# Patient Record
Sex: FEMALE
[De-identification: names, ages, dates, MRNs, and addresses within clinical notes are randomized per-mention and may not be internally consistent; named-entity substitution may affect disease eponyms.]

---

## 2017-10-04 ENCOUNTER — TRANSCRIPTION ENCOUNTER (OUTPATIENT)
Age: 82
End: 2017-10-04

## 2017-12-31 ENCOUNTER — TRANSCRIPTION ENCOUNTER (OUTPATIENT)
Age: 82
End: 2017-12-31

## 2018-10-11 ENCOUNTER — TRANSCRIPTION ENCOUNTER (OUTPATIENT)
Age: 83
End: 2018-10-11

## 2019-04-24 ENCOUNTER — TRANSCRIPTION ENCOUNTER (OUTPATIENT)
Age: 84
End: 2019-04-24

## 2019-05-23 PROBLEM — Z00.00 ENCOUNTER FOR PREVENTIVE HEALTH EXAMINATION: Status: ACTIVE | Noted: 2019-05-23

## 2019-05-24 ENCOUNTER — APPOINTMENT (OUTPATIENT)
Dept: HOME HEALTH SERVICES | Facility: HOME HEALTH | Age: 84
End: 2019-05-24
Payer: MEDICARE

## 2019-05-24 PROCEDURE — 99497 ADVNCD CARE PLAN 30 MIN: CPT

## 2019-05-24 PROCEDURE — 99344 HOME/RES VST NEW MOD MDM 60: CPT | Mod: 25

## 2019-05-24 PROCEDURE — G0506: CPT

## 2019-05-28 VITALS
HEART RATE: 76 BPM | OXYGEN SATURATION: 98 % | DIASTOLIC BLOOD PRESSURE: 82 MMHG | SYSTOLIC BLOOD PRESSURE: 126 MMHG | RESPIRATION RATE: 14 BRPM | TEMPERATURE: 98.4 F

## 2019-05-28 NOTE — HISTORY OF PRESENT ILLNESS
[Patient] : patient [Spouse] : spouse [Family Member] : family member [FreeTextEntry1] : unsteady gait [FreeTextEntry2] : Ms. Torres is an 84 year old female with hypertension, osteoporosis, mild intermittent asthma, environmental allergies who is being seen for initial visit. No recent hospitalizations. Blood pressure is controlled with hydrochlorothiazide 25 mg daily. Previously on amlodipine and ?ACEi which she did not tolerate. Allergies/asthma controlled with PRN albuterol nebs and montelukast 10 mg daily. She feels well over all. Walks with a walker but has noted some left left pain/weakness recently. Requesting physical therapy. Also needs a new walker as hers is broken.

## 2019-05-28 NOTE — COUNSELING
[Full Code] : Code Status: Full Code [Limited] : Treatment Guidelines: Limited [Trial of Intubation] : Intubation: Trial of Intubation [[facility] Date: _____] [facility]/last verification date: [unfilled] [_____] : HCP: [unfilled] [ - New patient with 2 or more chronic conditions; CCM discussed and patient-centered care plan established] : New patient with 2 or more chronic conditions; CCM discussed and patient-centered care plan established

## 2019-05-28 NOTE — PHYSICAL EXAM
[No Acute Distress] : no acute distress [Well Developed] : well developed [Well Nourished] : well nourished [Normal Sclera/Conjunctiva] : normal sclera/conjunctiva [EOMI] : extra ocular movement intact [Normal Outer Ear/Nose] : the ears and nose were normal in appearance [Normal Oropharynx] : the oropharynx was normal [No Respiratory Distress] : no respiratory distress [Clear to Auscultation] : lungs were clear to auscultation bilaterally [No Accessory Muscle Use] : no accessory muscle use [Normal Rate] : heart rate was normal  [Regular Rhythm] : with a regular rhythm [Normal S1, S2] : normal S1 and S2 [No Murmurs] : no murmurs heard [No Edema] : there was no peripheral edema [Normal Bowel Sounds] : normal bowel sounds [Non Tender] : non-tender [Soft] : abdomen soft [Not Distended] : not distended [Normal Post Cervical Nodes] : no posterior cervical lymphadenopathy [Normal Anterior Cervical Nodes] : no anterior cervical lymphadenopathy [No CVA Tenderness] : no ~M costovertebral angle tenderness [No Spinal Tenderness] : no spinal tenderness [Normal Gait] : normal gait [Normal Strength/Tone] : muscle strength and tone were normal [No Rash] : no rash [Oriented x3] : oriented to person, place, and time [Normal Affect] : the affect was normal

## 2019-05-28 NOTE — REASON FOR VISIT
[Initial Eval - Existing Diagnosis] : an initial evaluation of an existing diagnosis [Spouse] : spouse [Family Member] : family member [Pre-Visit Preparation] : pre-visit preparation was done [Intercurrent Specialty/Sub-specialty Visits] : the patient has no intercurrent specialty/sub-specialty visits [FreeTextEntry1] : hypertension, asthma, unsteady gait, osteoporosis

## 2019-07-18 ENCOUNTER — OTHER (OUTPATIENT)
Age: 84
End: 2019-07-18

## 2019-07-18 ENCOUNTER — LABORATORY RESULT (OUTPATIENT)
Age: 84
End: 2019-07-18

## 2019-07-18 LAB
ALBUMIN SERPL ELPH-MCNC: 4.8 G/DL
ALP BLD-CCNC: 48 U/L
ALT SERPL-CCNC: 10 U/L
ANION GAP SERPL CALC-SCNC: 12 MMOL/L
AST SERPL-CCNC: 18 U/L
BASOPHILS # BLD AUTO: 0.06 K/UL
BASOPHILS NFR BLD AUTO: 1 %
BILIRUB SERPL-MCNC: 0.4 MG/DL
BUN SERPL-MCNC: 26 MG/DL
CALCIUM SERPL-MCNC: 10.5 MG/DL
CHLORIDE SERPL-SCNC: 101 MMOL/L
CO2 SERPL-SCNC: 27 MMOL/L
CREAT SERPL-MCNC: 1.14 MG/DL
EOSINOPHIL # BLD AUTO: 0.39 K/UL
EOSINOPHIL NFR BLD AUTO: 6.2 %
HCT VFR BLD CALC: 37.1 %
HGB BLD-MCNC: 12.3 G/DL
IMM GRANULOCYTES NFR BLD AUTO: 0.3 %
LYMPHOCYTES # BLD AUTO: 2.25 K/UL
LYMPHOCYTES NFR BLD AUTO: 35.7 %
MAN DIFF?: NORMAL
MCHC RBC-ENTMCNC: 30.9 PG
MCHC RBC-ENTMCNC: 33.2 GM/DL
MCV RBC AUTO: 93.2 FL
MONOCYTES # BLD AUTO: 0.75 K/UL
MONOCYTES NFR BLD AUTO: 11.9 %
NEUTROPHILS # BLD AUTO: 2.83 K/UL
NEUTROPHILS NFR BLD AUTO: 44.9 %
PLATELET # BLD AUTO: 261 K/UL
POTASSIUM SERPL-SCNC: 4.4 MMOL/L
PROT SERPL-MCNC: 7.1 G/DL
RBC # BLD: 3.98 M/UL
RBC # FLD: 12.1 %
SODIUM SERPL-SCNC: 140 MMOL/L
WBC # FLD AUTO: 6.3 K/UL

## 2019-07-19 ENCOUNTER — OTHER (OUTPATIENT)
Age: 84
End: 2019-07-19

## 2019-07-19 LAB
APPEARANCE: CLEAR
BACTERIA UR CULT: NORMAL
BILIRUBIN URINE: NEGATIVE
BLOOD URINE: NEGATIVE
COLOR: YELLOW
GLUCOSE QUALITATIVE U: NEGATIVE
KETONES URINE: NEGATIVE
LEUKOCYTE ESTERASE URINE: ABNORMAL
NITRITE URINE: NEGATIVE
PH URINE: 5.5
PROTEIN URINE: NEGATIVE
SPECIFIC GRAVITY URINE: 1.02
UROBILINOGEN URINE: NORMAL

## 2019-07-23 ENCOUNTER — LABORATORY RESULT (OUTPATIENT)
Age: 84
End: 2019-07-23

## 2019-08-06 ENCOUNTER — APPOINTMENT (OUTPATIENT)
Dept: HOME HEALTH SERVICES | Facility: HOME HEALTH | Age: 84
End: 2019-08-06
Payer: MEDICARE

## 2019-08-06 VITALS
DIASTOLIC BLOOD PRESSURE: 76 MMHG | TEMPERATURE: 98.1 F | OXYGEN SATURATION: 97 % | HEART RATE: 80 BPM | SYSTOLIC BLOOD PRESSURE: 109 MMHG | RESPIRATION RATE: 14 BRPM

## 2019-08-06 PROCEDURE — 99350 HOME/RES VST EST HIGH MDM 60: CPT

## 2019-08-06 RX ORDER — CIPROFLOXACIN HYDROCHLORIDE 250 MG/1
250 TABLET, FILM COATED ORAL
Qty: 10 | Refills: 0 | Status: DISCONTINUED | COMMUNITY
Start: 2019-07-19 | End: 2019-08-06

## 2019-08-06 NOTE — COUNSELING
[Full Code] : Code Status: Full Code [Trial of Intubation] : Intubation: Trial of Intubation [Limited] : Treatment Guidelines: Limited [[facility] Date: _____] [facility] Completion/last verification date: [unfilled] [_____] : HCP: [unfilled] [Non - Smoker] : non-smoker [] : foot exam

## 2019-08-06 NOTE — REASON FOR VISIT
[Spouse] : spouse [Family Member] : family member [Pre-Visit Preparation] : pre-visit preparation was done [Follow-Up] : a follow-up visit [Intercurrent Specialty/Sub-specialty Visits] : the patient has no intercurrent specialty/sub-specialty visits [FreeTextEntry1] : hypertension, asthma, unsteady gait, osteoporosis

## 2019-08-06 NOTE — PHYSICAL EXAM
[Well Developed] : well developed [No Acute Distress] : no acute distress [Well Nourished] : well nourished [Normal Sclera/Conjunctiva] : normal sclera/conjunctiva [EOMI] : extra ocular movement intact [Normal Outer Ear/Nose] : the ears and nose were normal in appearance [Normal Oropharynx] : the oropharynx was normal [No Respiratory Distress] : no respiratory distress [Clear to Auscultation] : lungs were clear to auscultation bilaterally [No Accessory Muscle Use] : no accessory muscle use [Regular Rhythm] : with a regular rhythm [Normal Rate] : heart rate was normal  [Normal S1, S2] : normal S1 and S2 [No Edema] : there was no peripheral edema [No Murmurs] : no murmurs heard [Normal Bowel Sounds] : normal bowel sounds [Non Tender] : non-tender [Soft] : abdomen soft [Normal Post Cervical Nodes] : no posterior cervical lymphadenopathy [Not Distended] : not distended [Normal Anterior Cervical Nodes] : no anterior cervical lymphadenopathy [No CVA Tenderness] : no ~M costovertebral angle tenderness [No Spinal Tenderness] : no spinal tenderness [Normal Gait] : normal gait [Normal Strength/Tone] : muscle strength and tone were normal [Oriented x3] : oriented to person, place, and time [No Rash] : no rash [Normal Affect] : the affect was normal

## 2019-08-06 NOTE — CURRENT MEDS
[Compliant with medications] : Patient is compliant with medications [Medication and Allergies Reconciled] : medication and allergies reconciled [High Risk Medications Reviewed and Reconciled (Beers Criteria)] : high risk medications reviewed and reconciled [Reviewed patient reported medication adherence from Comprehensive Assessment] : Reviewed patient reported medication adherence from comprehensive assessment [Adherent to medications] : Patient is adherent to medications as prescribed

## 2019-08-06 NOTE — HISTORY OF PRESENT ILLNESS
[Spouse] : spouse [Patient] : patient [Family Member] : family member [FreeTextEntry1] : unsteady gait [FreeTextEntry2] : Ms. Torres is an 84 year old female with hypertension, osteoporosis, mild intermittent asthma, environmental allergies who is being seen for follow up of chronic medical conditions. Had some abdominal pain and diarrhea recently, but that has since resolved. Labs were normal at the time. Otherwise no interim events. Continues to use albuterol twice daily and nebulizer about once weekly. She only uses Singulair as needed now. Checking blood pressures occasionally and only remembers that the top number is usually in the 120s. Compliant with HCTZ. \par \par Complains of continued bilateral knee pain L>R. She does not take anything for the symptoms. Did not get physical therapy last time and declines it today. Daughter (who I spoke with by phone) is concerned because daughter feels that patient's whole body aches. I asked her if this started after taking risedronate but she denies that her whole body hurts. \par \par She is eating well, sleeping well. Bowels are regular. No problems with urination. She denies chest pain, shortness of breath, palpitations, dizziness. Has occasional headaches which self-resolve. No leg swelling.

## 2019-08-06 NOTE — LETTER HEADER
[Care Plan reviewed and provided to patient/caregiver] : Care plan reviewed and provided to patient/caregiver [Care Plan reviewed every ___ weeks] : Care plan reviewed every [unfilled] weeks [Patient/Caregiver agrees to have other providers send summary of their care to this office] : Patient/caregiver agrees to have other providers send summary of their care to this office [Care Plan managed/Care coordinated by: ___] : Care plan managed/Care coordinated by: [unfilled] [Initiation or substantial revisions made to care plan involving mod/high medical decision making for complex CCM] : Initiation or substantial revisions made to care plan involving mod/high medical decision making for complex CCM

## 2019-08-06 NOTE — REVIEW OF SYSTEMS
[Unsteady Walking] : ataxia [Negative] : Psychiatric [Headache] : no headache [Dizziness] : no dizziness [Fainting] : no fainting [Confusion] : no confusion [Memory Loss] : no memory loss

## 2019-08-06 NOTE — HEALTH RISK ASSESSMENT
[HRA Reviewed] : Health risk assessment reviewed [Independent] : managing medications [Some assistance needed] : managing finances [No falls in past year] : Patient reported no falls in the past year [No] : The patient does not have visual impairment [TimeGetUpGo] : 14

## 2019-08-09 LAB
ANA PAT FLD IF-IMP: NORMAL
ANA SER IF-ACNC: ABNORMAL
CHOLEST SERPL-MCNC: 204 MG/DL
CHOLEST/HDLC SERPL: 2.8 RATIO
CRP SERPL-MCNC: <0.1 MG/DL
ERYTHROCYTE [SEDIMENTATION RATE] IN BLOOD BY WESTERGREN METHOD: 6 MM/HR
ESTIMATED AVERAGE GLUCOSE: 97 MG/DL
HBA1C MFR BLD HPLC: 5 %
HDLC SERPL-MCNC: 73 MG/DL
LDLC SERPL CALC-MCNC: 116 MG/DL
TRIGL SERPL-MCNC: 75 MG/DL

## 2019-09-24 ENCOUNTER — APPOINTMENT (OUTPATIENT)
Dept: HOME HEALTH SERVICES | Facility: HOME HEALTH | Age: 84
End: 2019-09-24

## 2019-09-24 VITALS
TEMPERATURE: 97.9 F | SYSTOLIC BLOOD PRESSURE: 120 MMHG | DIASTOLIC BLOOD PRESSURE: 70 MMHG | OXYGEN SATURATION: 96 % | RESPIRATION RATE: 18 BRPM | HEART RATE: 83 BPM

## 2019-10-28 ENCOUNTER — APPOINTMENT (OUTPATIENT)
Dept: HOME HEALTH SERVICES | Facility: HOME HEALTH | Age: 84
End: 2019-10-28
Payer: MEDICARE

## 2019-10-28 VITALS
TEMPERATURE: 98 F | SYSTOLIC BLOOD PRESSURE: 130 MMHG | DIASTOLIC BLOOD PRESSURE: 60 MMHG | HEART RATE: 84 BPM | OXYGEN SATURATION: 97 % | RESPIRATION RATE: 16 BRPM

## 2019-10-28 PROCEDURE — 96160 PT-FOCUSED HLTH RISK ASSMT: CPT

## 2019-10-28 PROCEDURE — 99350 HOME/RES VST EST HIGH MDM 60: CPT | Mod: 25

## 2019-10-28 RX ORDER — RISEDRONATE SODIUM 150 MG/1
150 TABLET, FILM COATED ORAL
Qty: 3 | Refills: 3 | Status: DISCONTINUED | COMMUNITY
Start: 2019-05-24 | End: 2019-10-28

## 2019-10-28 NOTE — COUNSELING
[Non - Smoker] : non-smoker [] : foot exam [Full Code] : Code Status: Full Code [Limited] : Treatment Guidelines: Limited [Trial of Intubation] : Intubation: Trial of Intubation [[facility]: _____] [facility] Completion/last verification date: [unfilled] [_____] : HCP: [unfilled]

## 2019-10-29 ENCOUNTER — CHART COPY (OUTPATIENT)
Age: 84
End: 2019-10-29

## 2019-10-30 NOTE — REASON FOR VISIT
[Follow-Up] : a follow-up visit [Spouse] : spouse [Family Member] : family member [Pre-Visit Preparation] : pre-visit preparation was done [Intercurrent Specialty/Sub-specialty Visits] : the patient has no intercurrent specialty/sub-specialty visits [FreeTextEntry1] : hypertension, asthma, unsteady gait, osteoporosis

## 2019-10-30 NOTE — CHRONIC CARE ASSESSMENT
[Less than 3 Times Per Week] : exercises less than 3 times per week [Walking] : walking [Low Salt Diet] : low salt [General Adherence] : and is generally adherent

## 2019-10-30 NOTE — HISTORY OF PRESENT ILLNESS
[Patient] : patient [Spouse] : spouse [Family Member] : family member [FreeTextEntry1] : unsteady gait [FreeTextEntry2] : Ms. Torres is an 84 year old female with hypertension, osteoporosis, mild intermittent asthma, environmental allergies who is being seen for follow up of chronic medical conditions.\par \tal Has had cough x 3 days associated with aches, subjective fevers/chills. She is not checking temps. Cough is productive of white/yellow phlegm at times. She is using ventolin twice daily, Robitussin at night and nebulizer during the day. \par Had insect bite about 1 month ago. Was painful at first. Now has a continued itch. Using neosporin ointment without any improvement. \par Daughter concerned about patient's diffuse joint aches. Labs checked last time. 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 Bowels are regular. No problems with urination. She denies chest pain, shortness of breath, palpitations, dizziness. Has occasional headaches which self-resolve. No leg swelling.

## 2019-10-30 NOTE — HEALTH RISK ASSESSMENT
[Independent] : managing medications [Some assistance needed] : managing finances [No falls in past year] : Patient reported no falls in the past year [No] : The patient does not have visual impairment [HRA Reviewed] : Health risk assessments reviewed [TimeGetUpGo] : 14

## 2019-11-06 ENCOUNTER — APPOINTMENT (OUTPATIENT)
Dept: HOME HEALTH SERVICES | Facility: HOME HEALTH | Age: 84
End: 2019-11-06
Payer: MEDICARE

## 2019-11-06 VITALS
OXYGEN SATURATION: 97 % | DIASTOLIC BLOOD PRESSURE: 64 MMHG | TEMPERATURE: 98.1 F | SYSTOLIC BLOOD PRESSURE: 112 MMHG | RESPIRATION RATE: 14 BRPM | HEART RATE: 76 BPM

## 2019-11-06 PROCEDURE — 90662 IIV NO PRSV INCREASED AG IM: CPT

## 2019-11-06 PROCEDURE — G0008: CPT

## 2019-11-06 PROCEDURE — 99349 HOME/RES VST EST MOD MDM 40: CPT | Mod: 25

## 2019-11-06 NOTE — HISTORY OF PRESENT ILLNESS
[Patient] : patient [Spouse] : spouse [Family Member] : family member [FreeTextEntry1] : unsteady gait [FreeTextEntry2] : Ms. Torres is an 84 year old female with hypertension, osteoporosis, mild intermittent asthma, environmental allergies who is being seen for acute visit for follow up cough and flu shot. Had chest Xray which showed no acute processes. Cough has improved. Still using cough syrup, but ran out of her Ventolin. Needs a refill. \par \par Continues to eat well. Denies fevers, chills, diarrhea, nausea, weakness. Rashe on her left ankle is better with the triamcinolone cream.

## 2019-11-06 NOTE — COUNSELING
[Non - Smoker] : non-smoker [] : foot exam [Full Code] : Code Status: Full Code [Limited] : Treatment Guidelines: Limited [Trial of Intubation] : Intubation: Trial of Intubation [Last Ver[facility] Date: _____] : [facility] Completion/last verification date: [unfilled] [_____] : HCP: [unfilled]

## 2019-12-08 ENCOUNTER — TRANSCRIPTION ENCOUNTER (OUTPATIENT)
Age: 84
End: 2019-12-08

## 2020-01-10 ENCOUNTER — APPOINTMENT (OUTPATIENT)
Dept: HOME HEALTH SERVICES | Facility: HOME HEALTH | Age: 85
End: 2020-01-10
Payer: MEDICARE

## 2020-01-10 VITALS
HEART RATE: 77 BPM | SYSTOLIC BLOOD PRESSURE: 137 MMHG | TEMPERATURE: 98.4 F | DIASTOLIC BLOOD PRESSURE: 73 MMHG | RESPIRATION RATE: 14 BRPM | OXYGEN SATURATION: 98 %

## 2020-01-10 PROCEDURE — 99348 HOME/RES VST EST LOW MDM 30: CPT

## 2020-01-10 RX ORDER — BUDESONIDE AND FORMOTEROL FUMARATE DIHYDRATE 80; 4.5 UG/1; UG/1
80-4.5 AEROSOL RESPIRATORY (INHALATION) TWICE DAILY
Qty: 1 | Refills: 6 | Status: DISCONTINUED | COMMUNITY
Start: 2020-01-10 | End: 2020-01-10

## 2020-01-10 RX ORDER — AZITHROMYCIN 250 MG/1
250 TABLET, FILM COATED ORAL
Qty: 1 | Refills: 0 | Status: DISCONTINUED | COMMUNITY
Start: 2019-12-08 | End: 2020-01-10

## 2020-01-10 RX ORDER — BENZONATATE 200 MG/1
200 CAPSULE ORAL 3 TIMES DAILY
Qty: 30 | Refills: 0 | Status: DISCONTINUED | COMMUNITY
Start: 2019-10-28 | End: 2020-01-10

## 2020-01-10 NOTE — REVIEW OF SYSTEMS
[Unsteady Walking] : ataxia [Negative] : Psychiatric [Headache] : no headache [Dizziness] : no dizziness [Memory Loss] : no memory loss [Confusion] : no confusion [Fainting] : no fainting

## 2020-01-10 NOTE — PHYSICAL EXAM
[No Acute Distress] : no acute distress [Well Nourished] : well nourished [Normal Sclera/Conjunctiva] : normal sclera/conjunctiva [Well Developed] : well developed [EOMI] : extra ocular movement intact [Normal Outer Ear/Nose] : the ears and nose were normal in appearance [No Respiratory Distress] : no respiratory distress [Normal Oropharynx] : the oropharynx was normal [No Accessory Muscle Use] : no accessory muscle use [Clear to Auscultation] : lungs were clear to auscultation bilaterally [Normal Rate] : heart rate was normal  [Regular Rhythm] : with a regular rhythm [Normal S1, S2] : normal S1 and S2 [No Murmurs] : no murmurs heard [No Edema] : there was no peripheral edema [Normal Bowel Sounds] : normal bowel sounds [Non Tender] : non-tender [Soft] : abdomen soft [Normal Post Cervical Nodes] : no posterior cervical lymphadenopathy [Not Distended] : not distended [Normal Anterior Cervical Nodes] : no anterior cervical lymphadenopathy [No Spinal Tenderness] : no spinal tenderness [No CVA Tenderness] : no ~M costovertebral angle tenderness [Normal Gait] : normal gait [Normal Strength/Tone] : muscle strength and tone were normal [Oriented x3] : oriented to person, place, and time [No Rash] : no rash [Normal Affect] : the affect was normal

## 2020-01-10 NOTE — HEALTH RISK ASSESSMENT
[Independent] : managing medications [Some assistance needed] : using transportation [No falls in past year] : Patient reported no falls in the past year [No] : The patient does not have visual impairment [HRA Reviewed] : Health risk assessments reviewed [TimeGetUpGo] : 14

## 2020-01-10 NOTE — COUNSELING
[Non - Smoker] : non-smoker [] : foot exam [Full Code] : Code Status: Full Code [Limited] : Treatment Guidelines: Limited [Trial of Intubation] : Intubation: Trial of Intubation [[facility] Date: _____] [facility] Completion/last verification date: [unfilled] [_____] : HCP: [unfilled]

## 2020-01-10 NOTE — REASON FOR VISIT
[Follow-Up] : a follow-up visit [Spouse] : spouse [Family Member] : family member [Pre-Visit Preparation] : pre-visit preparation was done [FreeTextEntry1] : hypertension, asthma, unsteady gait, osteoporosis [Intercurrent Specialty/Sub-specialty Visits] : the patient has no intercurrent specialty/sub-specialty visits

## 2020-01-10 NOTE — HISTORY OF PRESENT ILLNESS
[Patient] : patient [Spouse] : spouse [Family Member] : family member [FreeTextEntry1] : unsteady gait [FreeTextEntry2] : Ms. Torres is an 84 year old female with hypertension, osteoporosis, mild intermittent asthma, environmental allergies who is being seen for follow up of chronic medical conditions.\par \par Cough x 2 days, continuously getting worse. \par Has a lot of aches in her neck, back. Sore throat. \par Subjective fevers last night. \par Cough is worse at night.\par Granddaughter was recently diagnosed with influenza.\par Using "Cough DM" and Zyrtec with minimal improvement in symptoms. \par Appetite is decreased. \par No nausea, vomiting, diarrhea.

## 2020-01-28 ENCOUNTER — APPOINTMENT (OUTPATIENT)
Dept: HOME HEALTH SERVICES | Facility: HOME HEALTH | Age: 85
End: 2020-01-28
Payer: MEDICARE

## 2020-01-28 DIAGNOSIS — R68.89 OTHER GENERAL SYMPTOMS AND SIGNS: ICD-10-CM

## 2020-01-28 PROCEDURE — 99350 HOME/RES VST EST HIGH MDM 60: CPT

## 2020-01-28 RX ORDER — FLUTICASONE PROPIONATE 110 UG/1
110 AEROSOL, METERED RESPIRATORY (INHALATION)
Qty: 12 | Refills: 6 | Status: DISCONTINUED | COMMUNITY
Start: 2020-01-10 | End: 2020-01-28

## 2020-01-28 RX ORDER — OSELTAMIVIR PHOSPHATE 75 MG/1
75 CAPSULE ORAL TWICE DAILY
Qty: 10 | Refills: 0 | Status: DISCONTINUED | COMMUNITY
Start: 2020-01-10 | End: 2020-01-28

## 2020-01-28 RX ORDER — IPRATROPIUM BROMIDE AND ALBUTEROL SULFATE 2.5; .5 MG/3ML; MG/3ML
0.5-2.5 (3) SOLUTION RESPIRATORY (INHALATION)
Qty: 2 | Refills: 3 | Status: DISCONTINUED | COMMUNITY
Start: 2019-12-08 | End: 2020-01-28

## 2020-01-28 RX ORDER — BROMPHENIRAMINE MALEATE, PSEUDOEPHEDRINE HYDROCHLORIDE AND DEXTROMETHORPHAN HYDROBROMIDE 2; 30; 10 MG/5ML; MG/5ML; MG/5ML
30-2-10 SYRUP ORAL
Qty: 1 | Refills: 5 | Status: DISCONTINUED | COMMUNITY
Start: 2020-01-10 | End: 2020-01-28

## 2020-01-28 NOTE — PHYSICAL EXAM
[Well Nourished] : well nourished [No Acute Distress] : no acute distress [Well Developed] : well developed [Normal Sclera/Conjunctiva] : normal sclera/conjunctiva [Normal Oropharynx] : the oropharynx was normal [EOMI] : extra ocular movement intact [Normal Outer Ear/Nose] : the ears and nose were normal in appearance [No Respiratory Distress] : no respiratory distress [Clear to Auscultation] : lungs were clear to auscultation bilaterally [Normal Rate] : heart rate was normal  [No Accessory Muscle Use] : no accessory muscle use [Regular Rhythm] : with a regular rhythm [Normal S1, S2] : normal S1 and S2 [No Murmurs] : no murmurs heard [Normal Bowel Sounds] : normal bowel sounds [No Edema] : there was no peripheral edema [Soft] : abdomen soft [Non Tender] : non-tender [Not Distended] : not distended [Normal Post Cervical Nodes] : no posterior cervical lymphadenopathy [Normal Anterior Cervical Nodes] : no anterior cervical lymphadenopathy [No CVA Tenderness] : no ~M costovertebral angle tenderness [No Spinal Tenderness] : no spinal tenderness [Normal Gait] : normal gait [Normal Strength/Tone] : muscle strength and tone were normal [Normal Affect] : the affect was normal [No Rash] : no rash [Oriented x3] : oriented to person, place, and time

## 2020-01-28 NOTE — REVIEW OF SYSTEMS
[Unsteady Walking] : ataxia [Negative] : Heme/Lymph [Headache] : no headache [Dizziness] : no dizziness [Confusion] : no confusion [Fainting] : no fainting [Memory Loss] : no memory loss

## 2020-01-28 NOTE — CHRONIC CARE ASSESSMENT
[Less than 3 Times Per Week] : exercises less than 3 times per week [Low Salt Diet] : low salt [Walking] : walking [General Adherence] : and is generally adherent

## 2020-01-28 NOTE — COUNSELING
[Non - Smoker] : non-smoker [] : foot exam [Full Code] : Code Status: Full Code [Trial of Intubation] : Intubation: Trial of Intubation [Limited] : Treatment Guidelines: Limited [_____] : HCP: [unfilled] [Last Verification Date: _____] : [email or web address] Completion/last verification date: [unfilled]

## 2020-01-28 NOTE — HISTORY OF PRESENT ILLNESS
[Spouse] : spouse [Patient] : patient [Family Member] : family member [FreeTextEntry1] : unsteady gait [FreeTextEntry2] : Ms. Torres is an 84 year old female with hypertension, osteoporosis, mild intermittent asthma, environmental allergies who is being seen for follow up of chronic medical conditions.\par \par Continues to have cough for the past two months. No longer having fevers. Using Flovent twice weekly, and reports that it helps when she uses it. Not using duonebs because it causes her to be hoarse. Also using "brotap DM". No further fevers, chills, nausea, anorexia, diarrhea. \par Checking blood pressures recently, usually less than 140/90s. \par Has been having some headaches. No dizziness. \par Drinking only 1 cup of water daily. \par Has intermittent rash on her ankles, which is improved by triamcinolone cream. \par Still using voltaren gel for joint aches with some improvement in symptoms. Scheduled to see rheumatology next month.\par Never got bone density test scheduled. \par \par She is eating well. Feels short of breath only when she coughs. Bowels are regular. No problems with urination. She denies chest pain, palpitations, dizziness. Has occasional headaches which self-resolve. No leg swelling.

## 2020-03-11 ENCOUNTER — TRANSCRIPTION ENCOUNTER (OUTPATIENT)
Age: 85
End: 2020-03-11

## 2020-03-11 ENCOUNTER — APPOINTMENT (OUTPATIENT)
Dept: HOME HEALTH SERVICES | Facility: HOME HEALTH | Age: 85
End: 2020-03-11

## 2020-04-15 ENCOUNTER — APPOINTMENT (OUTPATIENT)
Dept: HOME HEALTH SERVICES | Facility: HOME HEALTH | Age: 85
End: 2020-04-15
Payer: MEDICARE

## 2020-04-15 DIAGNOSIS — J06.9 ACUTE UPPER RESPIRATORY INFECTION, UNSPECIFIED: ICD-10-CM

## 2020-04-15 PROCEDURE — 99215 OFFICE O/P EST HI 40 MIN: CPT | Mod: 95

## 2020-04-15 RX ORDER — METHYLPREDNISOLONE 4 MG/1
4 TABLET ORAL
Qty: 1 | Refills: 0 | Status: DISCONTINUED | COMMUNITY
Start: 2020-01-28 | End: 2020-04-15

## 2020-04-15 NOTE — HISTORY OF PRESENT ILLNESS
[Patient] : patient [Spouse] : spouse [Family Member] : family member [FreeTextEntry1] : unsteady gait [FreeTextEntry2] : KACY TORRES is being seen for a visit provided via telehealth real-time audio visual technology.\par KACY TORRES was located at their home, [address] [place] [address]\[place] [address], at the time of the visit. \par The [facility] clinician, IVON CHAN, was located remotely at their home in [place] at the time of the visit.\par The patient, KACY TORRES, and the [facility] clinician, IVON CHAN, participated in the telehealth encounter. \par Other participants included: Lili Weeks (daughter)\par \par KACY TORRES (Apr 1 1935) or his/her representative consents to the use of telehealth. All questions related to telehealth answered.\par \par Ms. Torres is an 84 year old female with hypertension, osteoporosis, mild intermittent asthma, environmental allergies who is being seen for follow up of chronic medical conditions and acute visit for cough. \par \par Spoke with daughter and patient for follow up of medical conditions. \par Patient's  is sick with respiratory symptoms, possible COVID. Then patient developed fevers, cough, decreased appetite on 4/13/2020. Highest temp has been 102. Using tylenol and regular nebulizer treatment and feels much better today. Denies shortness of breath, now eating regular and energy is back to baseline per daughter. \par She is not having diarrhea, and bowel movements are regular. \par Joint pains are stable when she takes tylenol and occasionally using the diclofenac gel. \par Never got bone density test scheduled, but plans on doing this after COVID pandemic dies down. \par \par She denies chest pain, palpitations, headaches, dizziness, hearing/vision changes, urinary symptoms, constipation, diarrhea, rashes, skin break down, leg swelling.

## 2020-04-15 NOTE — COUNSELING
[Non - Smoker] : non-smoker [] : foot exam [Full Code] : Code Status: Full Code [Limited] : Treatment Guidelines: Limited [Trial of Intubation] : Intubation: Trial of Intubation [[facility] Date: _____] [facility]/last verification date: [unfilled] [_____] : HCP: [unfilled]

## 2020-04-20 ENCOUNTER — LABORATORY RESULT (OUTPATIENT)
Age: 85
End: 2020-04-20

## 2020-05-03 DIAGNOSIS — U07.1 COVID-19: ICD-10-CM

## 2020-05-06 ENCOUNTER — LABORATORY RESULT (OUTPATIENT)
Age: 85
End: 2020-05-06

## 2020-06-01 ENCOUNTER — APPOINTMENT (OUTPATIENT)
Dept: HOME HEALTH SERVICES | Facility: HOME HEALTH | Age: 85
End: 2020-06-01
Payer: MEDICARE

## 2020-06-01 ENCOUNTER — APPOINTMENT (OUTPATIENT)
Dept: HOME HEALTH SERVICES | Facility: HOME HEALTH | Age: 85
End: 2020-06-01

## 2020-06-01 PROCEDURE — 99350 HOME/RES VST EST HIGH MDM 60: CPT | Mod: 95

## 2020-06-02 RX ORDER — METHYLPREDNISOLONE 4 MG/1
4 TABLET ORAL
Qty: 1 | Refills: 0 | Status: DISCONTINUED | COMMUNITY
Start: 2020-04-19 | End: 2020-06-02

## 2020-06-02 RX ORDER — OLOPATADINE HCL 1 MG/ML
0.1 SOLUTION/ DROPS OPHTHALMIC
Qty: 5 | Refills: 0 | Status: DISCONTINUED | COMMUNITY
Start: 2020-06-01 | End: 2020-06-02

## 2020-06-02 NOTE — REASON FOR VISIT
[Intercurrent Specialty/Sub-specialty Visits] : the patient has no intercurrent specialty/sub-specialty visits [FreeTextEntry1] : hypertension, asthma, unsteady gait, osteoporosis

## 2020-06-02 NOTE — HISTORY OF PRESENT ILLNESS
[FreeTextEntry1] : unsteady gait [FreeTextEntry2] : KACY TORRES is being seen for a visit provided via telehealth real-time audio visual technology.\par KACY TORRES was located at their home, [address] [place] [address]\[place] [address], at the time of the visit. \par The [facility] clinician, IVON CHAN, was located remotely at their home in [place] at the time of the visit.\par The patient, KACY TORRES, and the [facility] clinician, IVON CHAN, participated in the telehealth encounter. \par Other participants included: Lili Weeks (daughter)\par \par KACY TORRES (Apr 1 1935) or his/her representative consents to the use of telehealth. All questions related to telehealth answered.\par \par Ms. Torres is an 84 year old female with hypertension, osteoporosis, mild intermittent asthma, environmental allergies who is being seen for right leg swelling and pain for the past 1 week. Pain and swelling is mainly concentrated behind the knee. Also complains of neck pain, particularly when she turns her neck for the past 1.5 weeks. She has tried 1 tablet of advil once with no relief. She has not tried anything else. Notes that when she turns her head she gets a shooting pain down her shoulder. Also notes bilateral eye watering and intermittent redness for the past 3 weeks. she  has not tried anything for this. She is out of singulair and feels her  asthma is still flaring up. REquesting a refill of singulair, ventolin and albuterol solution for nebulizer. Also wants repeat covid testing as [facility] has a higher price for COVID+ patients. \par \par She denies chest pain, palpitations, headaches, dizziness, hearing/vision changes, urinary symptoms, constipation, diarrhea, rashes, skin break down.

## 2020-06-02 NOTE — REVIEW OF SYSTEMS
[Headache] : no headache [Dizziness] : no dizziness [Fainting] : no fainting [Confusion] : no confusion [Memory Loss] : no memory loss

## 2020-06-22 ENCOUNTER — LABORATORY RESULT (OUTPATIENT)
Age: 85
End: 2020-06-22

## 2020-07-22 ENCOUNTER — APPOINTMENT (OUTPATIENT)
Dept: HOME HEALTH SERVICES | Facility: HOME HEALTH | Age: 85
End: 2020-07-22

## 2020-07-27 ENCOUNTER — APPOINTMENT (OUTPATIENT)
Dept: HOME HEALTH SERVICES | Facility: HOME HEALTH | Age: 85
End: 2020-07-27
Payer: MEDICARE

## 2020-07-27 DIAGNOSIS — H60.503 UNSPECIFIED ACUTE NONINFECTIVE OTITIS EXTERNA, BILATERAL: ICD-10-CM

## 2020-07-27 DIAGNOSIS — Z87.898 PERSONAL HISTORY OF OTHER SPECIFIED CONDITIONS: ICD-10-CM

## 2020-07-27 DIAGNOSIS — J06.9 ACUTE UPPER RESPIRATORY INFECTION, UNSPECIFIED: ICD-10-CM

## 2020-07-27 DIAGNOSIS — R19.7 DIARRHEA, UNSPECIFIED: ICD-10-CM

## 2020-07-27 DIAGNOSIS — M79.89 OTHER SPECIFIED SOFT TISSUE DISORDERS: ICD-10-CM

## 2020-07-27 DIAGNOSIS — Z87.39 PERSONAL HISTORY OF OTHER DISEASES OF THE MUSCULOSKELETAL SYSTEM AND CONNECTIVE TISSUE: ICD-10-CM

## 2020-07-27 DIAGNOSIS — H10.33 UNSPECIFIED ACUTE CONJUNCTIVITIS, BILATERAL: ICD-10-CM

## 2020-07-27 DIAGNOSIS — Q79.1 OTHER CONGENITAL MALFORMATIONS OF DIAPHRAGM: ICD-10-CM

## 2020-07-27 PROCEDURE — 99349 HOME/RES VST EST MOD MDM 40: CPT | Mod: 95

## 2020-07-27 NOTE — CHRONIC CARE ASSESSMENT
[Walking] : walking [Less than 3 Times Per Week] : exercises less than 3 times per week [Low Salt Diet] : low salt [General Adherence] : and is generally adherent

## 2020-08-02 PROBLEM — Z87.898 HISTORY OF URINARY FREQUENCY: Status: RESOLVED | Noted: 2019-07-18 | Resolved: 2020-08-02

## 2020-08-02 PROBLEM — Q79.1 DIAPHRAGM, EVENTRATION: Status: RESOLVED | Noted: 2019-11-04 | Resolved: 2020-08-02

## 2020-08-02 RX ORDER — ALBUTEROL SULFATE 2.5 MG/3ML
(2.5 MG/3ML) SOLUTION RESPIRATORY (INHALATION)
Qty: 1 | Refills: 2 | Status: DISCONTINUED | COMMUNITY
Start: 2020-01-28 | End: 2020-08-02

## 2020-08-02 RX ORDER — FLUTICASONE PROPIONATE 220 UG/1
220 AEROSOL, METERED RESPIRATORY (INHALATION)
Qty: 1 | Refills: 2 | Status: DISCONTINUED | COMMUNITY
Start: 2020-01-28 | End: 2020-08-02

## 2020-08-02 RX ORDER — HYDROCORTISONE AND ACETIC ACID OTIC 20.75; 10.375 MG/ML; MG/ML
1-2 SOLUTION AURICULAR (OTIC) TWICE DAILY
Qty: 1 | Refills: 0 | Status: DISCONTINUED | COMMUNITY
Start: 2020-01-28 | End: 2020-08-02

## 2020-08-02 NOTE — CURRENT MEDS
[Medication and Allergies Reconciled] : medication and allergies reconciled [Reviewed patient reported medication adherence from Comprehensive Assessment] : Reviewed patient reported medication adherence from comprehensive assessment [Adherent to medications] : Patient is adherent to medications as prescribed [de-identified] : managed by daughter

## 2020-08-02 NOTE — COUNSELING
[Non - Smoker] : non-smoker [] : foot exam [Full Code] : Code Status: Full Code [Trial of Intubation] : Intubation: Trial of Intubation [Limited] : Treatment Guidelines: Limited [_____] : HCP: [unfilled] [[facility] Date: _____] [facility] Completion/last verification date: [unfilled]

## 2020-08-02 NOTE — PHYSICAL EXAM
[Well Nourished] : well nourished [No Acute Distress] : no acute distress [Well Developed] : well developed [Normal Sclera/Conjunctiva] : normal sclera/conjunctiva [EOMI] : extra ocular movement intact [Normal Outer Ear/Nose] : the ears and nose were normal in appearance [No Accessory Muscle Use] : no accessory muscle use [No Respiratory Distress] : no respiratory distress [Normal Strength/Tone] : muscle strength and tone were normal [Normal Affect] : the affect was normal [de-identified] : looks well

## 2020-08-02 NOTE — REVIEW OF SYSTEMS
[Unsteady Walking] : ataxia [Negative] : Heme/Lymph [Headache] : no headache [Dizziness] : no dizziness [Memory Loss] : no memory loss [Confusion] : no confusion [Fainting] : no fainting [FreeTextEntry3] : as noted in HPI [FreeTextEntry6] : as noted in HPI

## 2020-08-02 NOTE — HEALTH RISK ASSESSMENT
[Independent] : managing medications [Some assistance needed] : doing laundry [No] : The patient does not have visual impairment [No falls in past year] : Patient reported no falls in the past year [HRA Reviewed] : Health risk assessments reviewed [TimeGetUpGo] : 14

## 2020-08-02 NOTE — HISTORY OF PRESENT ILLNESS
[Patient] : patient [Spouse] : spouse [Family Member] : family member [FreeTextEntry1] : unsteady gait [FreeTextEntry2] : KACY GALLO is being seen for a visit provided via telehealth real-time audio visual technology.\par KACY GALLO was located at their home, [address] [place] [address]\[place] [address], at the time of the visit.\par [facility] clinician, ROMERO ABDUL, was located remotely at their home in [place] at the time of the visit. \par The patient, KACY GALLO, and the [facility] clinician, ROMERO ABDUL, participated in the telehealth encounter.\par Other participants included: []\par \par KACY GALLO (Apr 1 1935) or his/her representative consents to the use of telehealth. All questions related to telehealth answered.\par \par PMH:  hypertension, osteoporosis, mild intermittent asthma, environmental allergies \par \par Telehealth visit with pt and daughter, pt quiet with no c/o; daughter reports pt has "eye issues" "they are yellow and draining" ; also reports pt has been coughing - using nebulizers as prescribed, concerned as pt  had COVID in June- daughter denies any fever or chills; also reports pt c/o leg cramps\par Appetite hads been good\par Moving bowels well \par Uses  tylenol and  diclofenac gel PRN joint pain\par Still needs bone density test - plans on doing this after COVID pandemic dies down. \par \par Asthma: as above, on duonebs, ventolin inhalers, Advair \par \par HTN: on hydrochlorothiazide\par \par

## 2020-08-02 NOTE — REASON FOR VISIT
[Follow-Up] : a follow-up visit [Family Member] : family member [Spouse] : spouse [Pre-Visit Preparation] : pre-visit preparation was done [Intercurrent Specialty/Sub-specialty Visits] : the patient has no intercurrent specialty/sub-specialty visits [FreeTextEntry1] : for chronic conditions

## 2020-08-03 LAB
ALBUMIN SERPL ELPH-MCNC: 5 G/DL
ALP BLD-CCNC: 53 U/L
ALT SERPL-CCNC: 15 U/L
AMYLASE/CREAT SERPL: 111 U/L
ANION GAP SERPL CALC-SCNC: 17 MMOL/L
AST SERPL-CCNC: 23 U/L
BASOPHILS # BLD AUTO: 0.05 K/UL
BASOPHILS NFR BLD AUTO: 0.8 %
BILIRUB SERPL-MCNC: 0.5 MG/DL
BUN SERPL-MCNC: 26 MG/DL
CALCIUM SERPL-MCNC: 10.2 MG/DL
CHLORIDE SERPL-SCNC: 101 MMOL/L
CO2 SERPL-SCNC: 23 MMOL/L
CREAT SERPL-MCNC: 0.94 MG/DL
EOSINOPHIL # BLD AUTO: 0.19 K/UL
EOSINOPHIL NFR BLD AUTO: 3.1 %
GLUCOSE SERPL-MCNC: 57 MG/DL
HCT VFR BLD CALC: 39 %
HGB BLD-MCNC: 12.3 G/DL
IMM GRANULOCYTES NFR BLD AUTO: 0.3 %
LPL SERPL-CCNC: 27 U/L
LYMPHOCYTES # BLD AUTO: 1.84 K/UL
LYMPHOCYTES NFR BLD AUTO: 29.6 %
MAN DIFF?: NORMAL
MCHC RBC-ENTMCNC: 30.9 PG
MCHC RBC-ENTMCNC: 31.5 GM/DL
MCV RBC AUTO: 98 FL
MONOCYTES # BLD AUTO: 0.61 K/UL
MONOCYTES NFR BLD AUTO: 9.8 %
NEUTROPHILS # BLD AUTO: 3.51 K/UL
NEUTROPHILS NFR BLD AUTO: 56.4 %
PLATELET # BLD AUTO: 248 K/UL
POTASSIUM SERPL-SCNC: 3.8 MMOL/L
PROT SERPL-MCNC: 7.1 G/DL
RBC # BLD: 3.98 M/UL
RBC # FLD: 12.6 %
SODIUM SERPL-SCNC: 141 MMOL/L
WBC # FLD AUTO: 6.22 K/UL

## 2020-08-19 ENCOUNTER — APPOINTMENT (OUTPATIENT)
Dept: HOME HEALTH SERVICES | Facility: HOME HEALTH | Age: 85
End: 2020-08-19

## 2020-09-17 ENCOUNTER — APPOINTMENT (OUTPATIENT)
Dept: HOME HEALTH SERVICES | Facility: HOME HEALTH | Age: 85
End: 2020-09-17

## 2020-09-17 ENCOUNTER — MED ADMIN CHARGE (OUTPATIENT)
Age: 85
End: 2020-09-17

## 2020-09-17 VITALS
TEMPERATURE: 98.6 F | OXYGEN SATURATION: 98 % | DIASTOLIC BLOOD PRESSURE: 62 MMHG | SYSTOLIC BLOOD PRESSURE: 108 MMHG | RESPIRATION RATE: 15 BRPM | HEART RATE: 67 BPM

## 2020-09-17 RX ORDER — GENTAMICIN SULFATE 3 MG/ML
0.3 SOLUTION OPHTHALMIC 4 TIMES DAILY
Qty: 1 | Refills: 0 | Status: COMPLETED | COMMUNITY
Start: 2020-07-27 | End: 2020-08-03

## 2020-11-11 ENCOUNTER — NON-APPOINTMENT (OUTPATIENT)
Age: 85
End: 2020-11-11

## 2020-11-11 ENCOUNTER — APPOINTMENT (OUTPATIENT)
Dept: HOME HEALTH SERVICES | Facility: HOME HEALTH | Age: 85
End: 2020-11-11

## 2020-11-12 ENCOUNTER — APPOINTMENT (OUTPATIENT)
Dept: HOME HEALTH SERVICES | Facility: HOME HEALTH | Age: 85
End: 2020-11-12

## 2020-11-12 ENCOUNTER — APPOINTMENT (OUTPATIENT)
Dept: HOME HEALTH SERVICES | Facility: HOME HEALTH | Age: 85
End: 2020-11-12
Payer: MEDICARE

## 2020-11-12 VITALS
TEMPERATURE: 97.6 F | SYSTOLIC BLOOD PRESSURE: 120 MMHG | DIASTOLIC BLOOD PRESSURE: 70 MMHG | RESPIRATION RATE: 17 BRPM | HEART RATE: 84 BPM

## 2020-11-12 DIAGNOSIS — Z87.898 PERSONAL HISTORY OF OTHER SPECIFIED CONDITIONS: ICD-10-CM

## 2020-11-12 PROCEDURE — 99349 HOME/RES VST EST MOD MDM 40: CPT

## 2020-11-12 NOTE — COUNSELING
[Non - Smoker] : non-smoker [] : foot exam [Full Code] : Code Status: Full Code [Limited] : Treatment Guidelines: Limited [Trial of Intubation] : Intubation: Trial of Intubation [[facility] Date: _____] [facility]/last verification date: [unfilled] [_____] : HCP: [unfilled]

## 2020-11-12 NOTE — HISTORY OF PRESENT ILLNESS
[Patient] : patient [Spouse] : spouse [Family Member] : family member [FreeTextEntry1] : unsteady gait [FreeTextEntry2] : PMH:  hypertension, osteoporosis, mild intermittent asthma, environmental allergies \par \par Patient denies fever, cough, trouble breathing, rash, vomiting and diarrhea. Patient has not been in close contact with someone covid positive. \par N95 mask, gloves, eye wear  used during visit: [Y ]. Total face to face time with patient is 30 min.\par \par Interval events: received flu shot\par \par Pt A&Ox3 reports she has pain, itching and swelling of her lower extremities with pain in her (L) ankle- daughter reports pt was on medication for osteoporosis which she has in her ankle and she is no longer taking it- per chart notes pt was on risedronate but it was stopped in 10/19 due to increased bone pain- pt was scheduled to have DEXA scan in 1/20 but did not have test secondary to insurance issues per her daughter  \par Pt wakes up every morning with puss in her left eye- she sleeps on her right side, her eyes are also watery and itchy all day- she uses multiple different eye drops throughout the day; daughter reports pt has seen   Dr Fortune for same and was told there was nothing pt can do but pt "has something wrong with her (R) eye and if she has pain in that eye she has to go to the hospital right away"\par Appetite has been good\par Moving bowels well \par \par Asthma: has no c/o SOB or wheeze, on DuoNeb, Ventolin inhalers, Advair \par \par HTN: on hydrochlorothiazide\par \par

## 2020-11-12 NOTE — REASON FOR VISIT
[Follow-Up] : a follow-up visit [Spouse] : spouse [Family Member] : family member [Pre-Visit Preparation] : pre-visit preparation was done [Intercurrent Specialty/Sub-specialty Visits] : the patient has no intercurrent specialty/sub-specialty visits [FreeTextEntry1] : for chronic conditions

## 2020-11-12 NOTE — CURRENT MEDS
[Medication and Allergies Reconciled] : medication and allergies reconciled [Reviewed patient reported medication adherence from Comprehensive Assessment] : Reviewed patient reported medication adherence from comprehensive assessment [Adherent to medications] : Patient is adherent to medications as prescribed [de-identified] : managed by daughter

## 2020-11-12 NOTE — REVIEW OF SYSTEMS
[Unsteady Walking] : ataxia [Joint Pain] : joint pain [Negative] : Respiratory [Headache] : no headache [Dizziness] : no dizziness [Fainting] : no fainting [Confusion] : no confusion [Memory Loss] : no memory loss [FreeTextEntry3] : as noted in HPI

## 2020-11-12 NOTE — PHYSICAL EXAM
[No Acute Distress] : no acute distress [Well Nourished] : well nourished [Well Developed] : well developed [Normal Sclera/Conjunctiva] : normal sclera/conjunctiva [EOMI] : extra ocular movement intact [Normal Outer Ear/Nose] : the ears and nose were normal in appearance [No Respiratory Distress] : no respiratory distress [No Accessory Muscle Use] : no accessory muscle use [Normal Strength/Tone] : muscle strength and tone were normal [Normal Affect] : the affect was normal [PERRL] : pupils equal, round and reactive to light [No JVD] : no jugular venous distention [Normal Rate] : heart rate was normal  [Regular Rhythm] : with a regular rhythm [Normal S1, S2] : normal S1 and S2 [No Murmurs] : no murmurs heard [No Edema] : there was no peripheral edema [Normal Bowel Sounds] : normal bowel sounds [Non Tender] : non-tender [Soft] : abdomen soft [Not Distended] : not distended [de-identified] : looks well, talkative, Icelandic speaking [de-identified] : bilateral LE darkened with venous changes (+) pedal pulses

## 2020-12-16 ENCOUNTER — NON-APPOINTMENT (OUTPATIENT)
Age: 85
End: 2020-12-16

## 2020-12-23 ENCOUNTER — APPOINTMENT (OUTPATIENT)
Dept: HOME HEALTH SERVICES | Facility: HOME HEALTH | Age: 85
End: 2020-12-23

## 2020-12-23 RX ORDER — DICLOFENAC SODIUM 10 MG/G
1 GEL TOPICAL
Qty: 1 | Refills: 0 | Status: DISCONTINUED | COMMUNITY
Start: 2019-08-06 | End: 2020-12-23

## 2021-01-29 ENCOUNTER — NON-APPOINTMENT (OUTPATIENT)
Age: 86
End: 2021-01-29

## 2021-02-11 ENCOUNTER — APPOINTMENT (OUTPATIENT)
Dept: HOME HEALTH SERVICES | Facility: HOME HEALTH | Age: 86
End: 2021-02-11
Payer: MEDICARE

## 2021-02-11 PROCEDURE — 99349 HOME/RES VST EST MOD MDM 40: CPT | Mod: 25,95

## 2021-02-11 PROCEDURE — 96160 PT-FOCUSED HLTH RISK ASSMT: CPT | Mod: 95

## 2021-02-18 NOTE — PHYSICAL EXAM
[No Acute Distress] : no acute distress [Well Nourished] : well nourished [Well Developed] : well developed [Normal Sclera/Conjunctiva] : normal sclera/conjunctiva [EOMI] : extra ocular movement intact [No JVD] : no jugular venous distention [Normal Outer Ear/Nose] : the ears and nose were normal in appearance [No Respiratory Distress] : no respiratory distress [No Accessory Muscle Use] : no accessory muscle use [No Edema] : there was no peripheral edema [No Joint Swelling] : no joint swelling seen [No Clubbing, Cyanosis] : no clubbing  or cyanosis of the fingernails [Normal Strength/Tone] : muscle strength and tone were normal [No Rash] : no rash [No Skin Lesions] : no skin lesions [Oriented x3] : oriented to person, place, and time [Normal Affect] : the affect was normal [de-identified] : looks well, talkative, Cuban speaking [de-identified] : bilateral LE darkened with venous changes

## 2021-02-18 NOTE — HISTORY OF PRESENT ILLNESS
[Patient] : patient [Spouse] : spouse [Family Member] : family member [FreeTextEntry1] : unsteady gait [FreeTextEntry2] : KACY GALLO is being seen for a visit provided via telehealth real-time audio visual technology.\par KACY GALLO was located at their home, [address] [place] [address]\[place] [address], at the time of the visit.\par [facility] clinician, ROMERO ABDUL, was located remotely at their home in [place] at the time of the visit. \par The patient, KACY GALLO, and the [facility] clinician, ROMERO ABDUL, participated in the telehealth encounter.\par Other participants included: daughter Lili\par KACY GALLO (Apr 1 1935) or his/her representative consents to the use of telehealth. All questions related to telehealth answered.\par \par PMH:  hypertension, osteoporosis, mild intermittent asthma, environmental allergies \par \par Interval events: none\par \par Pt A&Ox3 reports eyes are runny and pasty in the AM- has been evaluated by opthalmology Dr Fortune  for same in past and was told there was nothing pt can do- has eye drops but she has not been using them \par Appetite has been good\par Moving bowels well \par Daughter also reports LE edema continues - rx for arterial doppler and MATT  received by pt- to have tests done\par \par Osteoporosis -was on risedronate but it was stopped in 10/19 due to increased bone pain- rx for  have DEXA scan  received by pt- to have tests done\par \par Asthma: has no c/o SOB or wheeze, on DuoNeb, Ventolin inhalers, Advair - daughter to bring pt to see her pulmonologist at [facility]\par \par HTN: on hydrochlorothiazide\par \par Daughter requesting labs for autoimmune disease as she herself has lupus- made aware there are many autoimmune diseases of which most would have surfaced given pt age

## 2021-02-18 NOTE — CURRENT MEDS
[Medication and Allergies Reconciled] : medication and allergies reconciled [Reviewed patient reported medication adherence from Comprehensive Assessment] : Reviewed patient reported medication adherence from comprehensive assessment [Adherent to medications] : Patient is adherent to medications as prescribed [de-identified] : managed by daughter

## 2021-02-18 NOTE — REVIEW OF SYSTEMS
[Joint Pain] : joint pain [Unsteady Walking] : ataxia [Negative] : Heme/Lymph [Headache] : no headache [Dizziness] : no dizziness [Fainting] : no fainting [Confusion] : no confusion [FreeTextEntry3] : as noted in HPI [Memory Loss] : no memory loss

## 2021-02-18 NOTE — COUNSELING
[Non - Smoker] : non-smoker [] : foot exam [Full Code] : Code Status: Full Code [Limited] : Treatment Guidelines: Limited [Trial of Intubation] : Intubation: Trial of Intubation [[facility]: _____] [facility]/last verification date: [unfilled] [_____] : HCP: [unfilled]

## 2021-02-23 ENCOUNTER — LABORATORY RESULT (OUTPATIENT)
Age: 86
End: 2021-02-23

## 2021-02-25 LAB
25(OH)D3 SERPL-MCNC: 32.1 NG/ML
ALBUMIN SERPL ELPH-MCNC: 4.4 G/DL
ALP BLD-CCNC: 62 U/L
ALT SERPL-CCNC: 13 U/L
ANA PAT FLD IF-IMP: ABNORMAL
ANA SER IF-ACNC: ABNORMAL
ANION GAP SERPL CALC-SCNC: 12 MMOL/L
AST SERPL-CCNC: 18 U/L
BASOPHILS # BLD AUTO: 0.06 K/UL
BASOPHILS NFR BLD AUTO: 1.1 %
BILIRUB SERPL-MCNC: 0.4 MG/DL
BUN SERPL-MCNC: 27 MG/DL
CALCIUM SERPL-MCNC: 9.8 MG/DL
CHLORIDE SERPL-SCNC: 100 MMOL/L
CHOLEST SERPL-MCNC: 190 MG/DL
CO2 SERPL-SCNC: 24 MMOL/L
CREAT SERPL-MCNC: 1.13 MG/DL
CRP SERPL-MCNC: <0.1 MG/DL
EOSINOPHIL # BLD AUTO: 0.27 K/UL
EOSINOPHIL NFR BLD AUTO: 5 %
ERYTHROCYTE [SEDIMENTATION RATE] IN BLOOD BY WESTERGREN METHOD: 10 MM/HR
HCT VFR BLD CALC: 37.9 %
HDLC SERPL-MCNC: 74 MG/DL
HGB BLD-MCNC: 12.2 G/DL
IMM GRANULOCYTES NFR BLD AUTO: 0.2 %
LDLC SERPL CALC-MCNC: 103 MG/DL
LYMPHOCYTES # BLD AUTO: 1.91 K/UL
LYMPHOCYTES NFR BLD AUTO: 35 %
MAN DIFF?: NORMAL
MCHC RBC-ENTMCNC: 30.3 PG
MCHC RBC-ENTMCNC: 32.2 GM/DL
MCV RBC AUTO: 94 FL
MONOCYTES # BLD AUTO: 0.67 K/UL
MONOCYTES NFR BLD AUTO: 12.3 %
NEUTROPHILS # BLD AUTO: 2.53 K/UL
NEUTROPHILS NFR BLD AUTO: 46.4 %
NONHDLC SERPL-MCNC: 116 MG/DL
PLATELET # BLD AUTO: 301 K/UL
POTASSIUM SERPL-SCNC: 5.1 MMOL/L
PROT SERPL-MCNC: 6.7 G/DL
RBC # BLD: 4.03 M/UL
RBC # FLD: 12.1 %
SODIUM SERPL-SCNC: 137 MMOL/L
TRIGL SERPL-MCNC: 62 MG/DL
TSH SERPL-ACNC: 1.4 UIU/ML
WBC # FLD AUTO: 5.45 K/UL

## 2021-03-03 ENCOUNTER — NON-APPOINTMENT (OUTPATIENT)
Age: 86
End: 2021-03-03

## 2021-03-04 ENCOUNTER — NON-APPOINTMENT (OUTPATIENT)
Age: 86
End: 2021-03-04

## 2021-04-11 ENCOUNTER — FORM ENCOUNTER (OUTPATIENT)
Age: 86
End: 2021-04-11

## 2021-04-26 ENCOUNTER — NON-APPOINTMENT (OUTPATIENT)
Age: 86
End: 2021-04-26

## 2021-05-07 ENCOUNTER — APPOINTMENT (OUTPATIENT)
Dept: HOME HEALTH SERVICES | Facility: HOME HEALTH | Age: 86
End: 2021-05-07
Payer: MEDICARE

## 2021-05-07 VITALS
TEMPERATURE: 98.6 F | HEART RATE: 87 BPM | DIASTOLIC BLOOD PRESSURE: 60 MMHG | RESPIRATION RATE: 16 BRPM | SYSTOLIC BLOOD PRESSURE: 108 MMHG | OXYGEN SATURATION: 98 %

## 2021-05-07 DIAGNOSIS — H10.13 ACUTE ATOPIC CONJUNCTIVITIS, BILATERAL: ICD-10-CM

## 2021-05-07 PROCEDURE — 99349 HOME/RES VST EST MOD MDM 40: CPT

## 2021-05-07 NOTE — COUNSELING
[Non - Smoker] : non-smoker [] : foot exam [Full Code] : Code Status: Full Code [Limited] : Treatment Guidelines: Limited [Trial of Intubation] : Intubation: Trial of Intubation [[facility]: _____] [facility]/last verification date: [unfilled] [_____] : HCP: [unfilled]

## 2021-05-10 PROBLEM — H10.13 ALLERGIC CONJUNCTIVITIS OF BOTH EYES: Status: ACTIVE | Noted: 2020-06-01

## 2021-05-10 NOTE — HISTORY OF PRESENT ILLNESS
[Patient] : patient [Spouse] : spouse [Family Member] : family member [FreeTextEntry1] : unsteady gait [FreeTextEntry2] : Patient denies fever, cough, trouble breathing, rash, vomiting and diarrhea. Patient has not been in close contact with someone covid positive. \par N95 mask, gloves, eye wear  used during visit: [Y ]. Total face to face time with patient is 35 min.\par \par PMH:  hypertension, osteoporosis, mild intermittent asthma, environmental allergies \par \par Interval events: none\par \par Pt A&Ox3 reports contnuing pain in ankle & feet with  LE edema continues - rx for arterial doppler and MATT given but not done yet\par Daughter via phone reports eyes remain runny and pasty in the AM- has been evaluated by opthalmology Dr Fortune  for same in past and was told there was nothing pt can do- has eye drops in home as needed; also reports pt more forgetful and she needs more help caring for herself and her elderly \par Appetite has been good\par Moving bowels well \par \par Osteoporosis -was on risedronate but it was stopped in 10/19 due to increased bone pain- rx for  have DEXA scan given but not done yet\par \par Asthma: has no c/o SOB or wheeze, on DuoNeb, Ventolin inhalers, Advair - has pulmonologist at [facility]\par \par HTN: on hydrochlorothiazide\par

## 2021-05-10 NOTE — REVIEW OF SYSTEMS
[Joint Pain] : joint pain [Unsteady Walking] : ataxia [Negative] : Heme/Lymph [Headache] : no headache [Dizziness] : no dizziness [Fainting] : no fainting [Confusion] : no confusion [Memory Loss] : no memory loss [FreeTextEntry3] : as noted in HPI

## 2021-05-10 NOTE — PHYSICAL EXAM
[No Acute Distress] : no acute distress [Well Nourished] : well nourished [Well Developed] : well developed [Normal Sclera/Conjunctiva] : normal sclera/conjunctiva [EOMI] : extra ocular movement intact [Normal Outer Ear/Nose] : the ears and nose were normal in appearance [No JVD] : no jugular venous distention [No Respiratory Distress] : no respiratory distress [No Accessory Muscle Use] : no accessory muscle use [No Edema] : there was no peripheral edema [No Joint Swelling] : no joint swelling seen [No Clubbing, Cyanosis] : no clubbing  or cyanosis of the fingernails [Normal Strength/Tone] : muscle strength and tone were normal [No Rash] : no rash [No Skin Lesions] : no skin lesions [Oriented x3] : oriented to person, place, and time [Normal Affect] : the affect was normal [PERRL] : pupils equal, round and reactive to light [Supple] : the neck was supple [Clear to Auscultation] : lungs were clear to auscultation bilaterally [Normal Rate] : heart rate was normal  [Regular Rhythm] : with a regular rhythm [Normal S1, S2] : normal S1 and S2 [No Murmurs] : no murmurs heard [Normal Bowel Sounds] : normal bowel sounds [Non Tender] : non-tender [Soft] : abdomen soft [Not Distended] : not distended [No CVA Tenderness] : no ~M costovertebral angle tenderness [No Spinal Tenderness] : no spinal tenderness [Kyphosis] : no kyphosis present [Scoliosis] : scoliosis not present [de-identified] : looks well, talkative, Turkmen speaking [de-identified] : bilateral LE darkened with venous changes

## 2021-05-10 NOTE — CURRENT MEDS
[Medication and Allergies Reconciled] : medication and allergies reconciled [Reviewed patient reported medication adherence from Comprehensive Assessment] : Reviewed patient reported medication adherence from comprehensive assessment [Adherent to medications] : Patient is adherent to medications as prescribed [de-identified] : managed by daughter

## 2021-06-10 ENCOUNTER — APPOINTMENT (OUTPATIENT)
Dept: HOME HEALTH SERVICES | Facility: HOME HEALTH | Age: 86
End: 2021-06-10

## 2021-08-02 ENCOUNTER — NON-APPOINTMENT (OUTPATIENT)
Age: 86
End: 2021-08-02

## 2021-08-03 ENCOUNTER — APPOINTMENT (OUTPATIENT)
Dept: HOME HEALTH SERVICES | Facility: HOME HEALTH | Age: 86
End: 2021-08-03
Payer: MEDICARE

## 2021-08-03 VITALS
TEMPERATURE: 98 F | SYSTOLIC BLOOD PRESSURE: 120 MMHG | DIASTOLIC BLOOD PRESSURE: 60 MMHG | RESPIRATION RATE: 14 BRPM | HEART RATE: 78 BPM | OXYGEN SATURATION: 98 %

## 2021-08-03 PROCEDURE — 99349 HOME/RES VST EST MOD MDM 40: CPT

## 2021-08-09 NOTE — PHYSICAL EXAM
[No Acute Distress] : no acute distress [Well Nourished] : well nourished [Well Developed] : well developed [Normal Sclera/Conjunctiva] : normal sclera/conjunctiva [PERRL] : pupils equal, round and reactive to light [EOMI] : extra ocular movement intact [Normal Outer Ear/Nose] : the ears and nose were normal in appearance [No JVD] : no jugular venous distention [Supple] : the neck was supple [No Respiratory Distress] : no respiratory distress [Clear to Auscultation] : lungs were clear to auscultation bilaterally [No Accessory Muscle Use] : no accessory muscle use [Normal Rate] : heart rate was normal  [Regular Rhythm] : with a regular rhythm [Normal S1, S2] : normal S1 and S2 [No Murmurs] : no murmurs heard [No Edema] : there was no peripheral edema [Normal Bowel Sounds] : normal bowel sounds [Non Tender] : non-tender [Soft] : abdomen soft [Not Distended] : not distended [No CVA Tenderness] : no ~M costovertebral angle tenderness [No Spinal Tenderness] : no spinal tenderness [No Joint Swelling] : no joint swelling seen [No Clubbing, Cyanosis] : no clubbing  or cyanosis of the fingernails [Normal Strength/Tone] : muscle strength and tone were normal [No Rash] : no rash [No Skin Lesions] : no skin lesions [Oriented x3] : oriented to person, place, and time [Normal Affect] : the affect was normal [Kyphosis] : no kyphosis present [Scoliosis] : scoliosis not present [de-identified] : looks well, talkative, Congolese speaking [de-identified] : bilateral LE darkened with venous changes

## 2021-08-09 NOTE — HISTORY OF PRESENT ILLNESS
[Patient] : patient [Spouse] : spouse [Family Member] : family member [FreeTextEntry1] : unsteady gait [FreeTextEntry2] : Patient denies fever, cough, trouble breathing, rash, vomiting and diarrhea. Patient has not been in close contact with someone covid positive. \par N95 mask, gloves, eye wear  used during visit: [Y ]. Total face to face time with patient is 35 min.\par \par PMH:  hypertension, osteoporosis, mild intermittent asthma, environmental allergies \par \par Interval events: none\par \par Pt A&Ox3; pt & daughter report asthma has been bad- does not use nebs consistently- reports (R) ankle now dark in color but has no pain \par Appetite is good- weight is stable\par Moving bowels well \par Has no incontinence\par Ambulates well through home with no recent falls \par \par Osteoporosis -was on risedronate but it was stopped in 10/19 due to increased bone pain- rx for  have DEXA scan given but not done yet\par \par Asthma: has no c/o SOB or wheeze, on DuoNeb, Ventolin inhalers, Advair - has pulmonologist at [facility]\par \par HTN: on hydrochlorothiazide\par

## 2021-08-09 NOTE — REVIEW OF SYSTEMS
[Joint Pain] : joint pain [Unsteady Walking] : ataxia [Negative] : Heme/Lymph [Headache] : no headache [Dizziness] : no dizziness [Confusion] : no confusion [Fainting] : no fainting [Memory Loss] : no memory loss [FreeTextEntry3] : as noted in HPI [FreeTextEntry6] : as noted in HPI

## 2021-08-09 NOTE — COUNSELING
[Non - Smoker] : non-smoker [] : foot exam [Full Code] : Code Status: Full Code [Limited] : Treatment Guidelines: Limited [Trial of Intubation] : Intubation: Trial of Intubation [_____] : HCP: [unfilled] [Maintain functional ability] : maintain functional ability [Last Verification Date: _____] : [facility] Completion/last verification date: [unfilled]

## 2021-08-09 NOTE — HEALTH RISK ASSESSMENT
[Independent] : managing medications [Some assistance needed] : managing finances [No falls in past year] : Patient reported no falls in the past year [No] : The patient does not have visual impairment [HRA Reviewed] : Health risk assessments reviewed [TimeGetUpGo] : 18

## 2021-08-09 NOTE — CURRENT MEDS
[Medication and Allergies Reconciled] : medication and allergies reconciled [Reviewed patient reported medication adherence from Comprehensive Assessment] : Reviewed patient reported medication adherence from comprehensive assessment [Adherent to medications] : Patient is adherent to medications as prescribed [de-identified] : managed by daughter

## 2021-08-12 ENCOUNTER — NON-APPOINTMENT (OUTPATIENT)
Age: 86
End: 2021-08-12

## 2021-08-12 LAB
ALBUMIN SERPL ELPH-MCNC: 4.5 G/DL
ANION GAP SERPL CALC-SCNC: 13 MMOL/L
BASOPHILS # BLD AUTO: 0.04 K/UL
BASOPHILS NFR BLD AUTO: 0.6 %
BUN SERPL-MCNC: 24 MG/DL
CALCIUM SERPL-MCNC: 9.8 MG/DL
CHLORIDE SERPL-SCNC: 103 MMOL/L
CO2 SERPL-SCNC: 26 MMOL/L
CREAT SERPL-MCNC: 1.11 MG/DL
EOSINOPHIL # BLD AUTO: 0.28 K/UL
EOSINOPHIL NFR BLD AUTO: 4.5 %
GLUCOSE SERPL-MCNC: 93 MG/DL
HCT VFR BLD CALC: 34.8 %
HGB BLD-MCNC: 11.7 G/DL
IMM GRANULOCYTES NFR BLD AUTO: 0.2 %
LYMPHOCYTES # BLD AUTO: 2.28 K/UL
LYMPHOCYTES NFR BLD AUTO: 36.2 %
MAN DIFF?: NORMAL
MCHC RBC-ENTMCNC: 31.1 PG
MCHC RBC-ENTMCNC: 33.6 GM/DL
MCV RBC AUTO: 92.6 FL
MONOCYTES # BLD AUTO: 0.53 K/UL
MONOCYTES NFR BLD AUTO: 8.4 %
NEUTROPHILS # BLD AUTO: 3.15 K/UL
NEUTROPHILS NFR BLD AUTO: 50.1 %
PHOSPHATE SERPL-MCNC: 4 MG/DL
PLATELET # BLD AUTO: 244 K/UL
POTASSIUM SERPL-SCNC: 4.1 MMOL/L
PREALB SERPL NEPH-MCNC: 16 MG/DL
RBC # BLD: 3.76 M/UL
RBC # FLD: 12.7 %
SODIUM SERPL-SCNC: 142 MMOL/L
WBC # FLD AUTO: 6.29 K/UL

## 2021-08-23 ENCOUNTER — NON-APPOINTMENT (OUTPATIENT)
Age: 86
End: 2021-08-23

## 2021-09-03 ENCOUNTER — APPOINTMENT (OUTPATIENT)
Dept: HOME HEALTH SERVICES | Facility: HOME HEALTH | Age: 86
End: 2021-09-03

## 2021-09-08 ENCOUNTER — NON-APPOINTMENT (OUTPATIENT)
Age: 86
End: 2021-09-08

## 2021-09-08 ENCOUNTER — FORM ENCOUNTER (OUTPATIENT)
Age: 86
End: 2021-09-08

## 2021-09-09 ENCOUNTER — APPOINTMENT (OUTPATIENT)
Dept: HOME HEALTH SERVICES | Facility: HOME HEALTH | Age: 86
End: 2021-09-09

## 2021-09-09 ENCOUNTER — NON-APPOINTMENT (OUTPATIENT)
Age: 86
End: 2021-09-09

## 2021-09-09 VITALS
OXYGEN SATURATION: 96 % | RESPIRATION RATE: 16 BRPM | DIASTOLIC BLOOD PRESSURE: 78 MMHG | TEMPERATURE: 98.1 F | SYSTOLIC BLOOD PRESSURE: 142 MMHG | HEART RATE: 70 BPM

## 2021-09-22 ENCOUNTER — NON-APPOINTMENT (OUTPATIENT)
Age: 86
End: 2021-09-22

## 2021-10-13 ENCOUNTER — NON-APPOINTMENT (OUTPATIENT)
Age: 86
End: 2021-10-13

## 2021-10-15 ENCOUNTER — NON-APPOINTMENT (OUTPATIENT)
Age: 86
End: 2021-10-15

## 2021-10-25 ENCOUNTER — NON-APPOINTMENT (OUTPATIENT)
Age: 86
End: 2021-10-25

## 2021-10-29 ENCOUNTER — APPOINTMENT (OUTPATIENT)
Dept: HOME HEALTH SERVICES | Facility: HOME HEALTH | Age: 86
End: 2021-10-29
Payer: MEDICARE

## 2021-10-29 VITALS
HEART RATE: 68 BPM | TEMPERATURE: 98.1 F | SYSTOLIC BLOOD PRESSURE: 132 MMHG | OXYGEN SATURATION: 98 % | RESPIRATION RATE: 16 BRPM | DIASTOLIC BLOOD PRESSURE: 70 MMHG

## 2021-10-29 DIAGNOSIS — Z87.09 PERSONAL HISTORY OF OTHER DISEASES OF THE RESPIRATORY SYSTEM: ICD-10-CM

## 2021-10-29 DIAGNOSIS — B36.9 SUPERFICIAL MYCOSIS, UNSPECIFIED: ICD-10-CM

## 2021-10-29 DIAGNOSIS — S09.90XA UNSPECIFIED INJURY OF HEAD, INITIAL ENCOUNTER: ICD-10-CM

## 2021-10-29 PROCEDURE — 99349 HOME/RES VST EST MOD MDM 40: CPT | Mod: 25

## 2021-10-29 PROCEDURE — 90662 IIV NO PRSV INCREASED AG IM: CPT

## 2021-10-29 PROCEDURE — G0008: CPT

## 2021-10-29 RX ORDER — IPRATROPIUM BROMIDE AND ALBUTEROL SULFATE 2.5; .5 MG/3ML; MG/3ML
0.5-2.5 (3) SOLUTION RESPIRATORY (INHALATION)
Qty: 1 | Refills: 3 | Status: DISCONTINUED | COMMUNITY
Start: 2020-04-15 | End: 2021-10-29

## 2021-10-29 NOTE — COUNSELING
[Non - Smoker] : non-smoker [] : foot exam [Full Code] : Code Status: Full Code [Limited] : Treatment Guidelines: Limited [Trial of Intubation] : Intubation: Trial of Intubation [[facility] Date: _____] : [facility] Completion/last verification date: [unfilled] [_____] : HCP: [unfilled]

## 2021-11-01 PROBLEM — B36.9 DERMATITIS FUNGAL: Status: RESOLVED | Noted: 2021-08-03 | Resolved: 2021-11-01

## 2021-11-01 PROBLEM — S09.90XA TRAUMATIC INJURY OF HEAD, INITIAL ENCOUNTER: Status: RESOLVED | Noted: 2021-09-09 | Resolved: 2021-11-01

## 2021-11-01 PROBLEM — Z87.09 HISTORY OF ALLERGIC RHINITIS: Status: RESOLVED | Noted: 2019-08-06 | Resolved: 2021-11-01

## 2021-11-01 NOTE — HISTORY OF PRESENT ILLNESS
[Patient] : patient [Spouse] : spouse [Family Member] : family member [FreeTextEntry1] : unsteady gait [FreeTextEntry2] : Patient denies fever, cough, trouble breathing, rash, vomiting and diarrhea. Patient has not been in close contact with someone covid positive. \par N95 mask, gloves, eye wear  used during visit: [Y ]. Total face to face time with patient is 35 min.\par \par PMH:  hypertension, osteoporosis, mild intermittent asthma, environmental allergies \par \par Interval events: none\par \par Pt A&Ox3; daughter Lili reports pt asthma "really bad"- recommended pulmonary follow up- Lili reports pt will she her pulmonologist but pt needs a  CT scan of chest before appointment can be made- also reports she does not know the name of her pulmonologist at [facility] \par Appetite has been good\par Moving bowels well \par \par Osteoporosis -was on risedronate but it was stopped in 10/19 due to increased bone pain- rx for  have DEXA scan given but not done yet\par \par Asthma: as above; on DuoNeb, Ventolin inhalers, Advair \par \par HTN: on hydrochlorothiazide\par

## 2021-11-01 NOTE — CURRENT MEDS
[Medication and Allergies Reconciled] : medication and allergies reconciled [Reviewed patient reported medication adherence from Comprehensive Assessment] : Reviewed patient reported medication adherence from comprehensive assessment [Adherent to medications] : Patient is adherent to medications as prescribed [de-identified] : managed by daughter

## 2021-11-01 NOTE — PHYSICAL EXAM
[No Acute Distress] : no acute distress [Well Nourished] : well nourished [Well Developed] : well developed [Normal Sclera/Conjunctiva] : normal sclera/conjunctiva [PERRL] : pupils equal, round and reactive to light [EOMI] : extra ocular movement intact [Normal Outer Ear/Nose] : the ears and nose were normal in appearance [No JVD] : no jugular venous distention [Supple] : the neck was supple [No Respiratory Distress] : no respiratory distress [Clear to Auscultation] : lungs were clear to auscultation bilaterally [No Accessory Muscle Use] : no accessory muscle use [Normal Rate] : heart rate was normal  [Regular Rhythm] : with a regular rhythm [Normal S1, S2] : normal S1 and S2 [No Murmurs] : no murmurs heard [No Edema] : there was no peripheral edema [Normal Bowel Sounds] : normal bowel sounds [Non Tender] : non-tender [Soft] : abdomen soft [Not Distended] : not distended [No CVA Tenderness] : no ~M costovertebral angle tenderness [No Spinal Tenderness] : no spinal tenderness [No Joint Swelling] : no joint swelling seen [No Clubbing, Cyanosis] : no clubbing  or cyanosis of the fingernails [Normal Strength/Tone] : muscle strength and tone were normal [No Rash] : no rash [No Skin Lesions] : no skin lesions [Oriented x3] : oriented to person, place, and time [Normal Affect] : the affect was normal [Kyphosis] : no kyphosis present [Scoliosis] : scoliosis not present [de-identified] : looks well, talkative, Azerbaijani speaking [de-identified] : bilateral LE darkened with venous changes

## 2021-11-01 NOTE — REVIEW OF SYSTEMS
[Joint Pain] : joint pain [Unsteady Walking] : ataxia [Negative] : Eyes [Headache] : no headache [Dizziness] : no dizziness [Fainting] : no fainting [Confusion] : no confusion [Memory Loss] : no memory loss [FreeTextEntry6] : as noted in HPI

## 2021-11-14 ENCOUNTER — NON-APPOINTMENT (OUTPATIENT)
Age: 86
End: 2021-11-14

## 2021-11-15 ENCOUNTER — APPOINTMENT (OUTPATIENT)
Dept: HOME HEALTH SERVICES | Facility: HOME HEALTH | Age: 86
End: 2021-11-15

## 2021-11-15 VITALS
RESPIRATION RATE: 17 BRPM | DIASTOLIC BLOOD PRESSURE: 70 MMHG | HEART RATE: 94 BPM | TEMPERATURE: 97.6 F | SYSTOLIC BLOOD PRESSURE: 134 MMHG | OXYGEN SATURATION: 96 %

## 2021-11-17 RX ORDER — DIPHENHYDRAMINE HYDROCHLORIDE 50 MG/ML
50 INJECTION, SOLUTION INTRAMUSCULAR; INTRAVENOUS
Qty: 1 | Refills: 0 | Status: DISCONTINUED | COMMUNITY
Start: 2021-11-15 | End: 2021-11-15

## 2021-11-17 RX ORDER — DIPHENHYDRAMINE HCL 25 MG/1
25 TABLET ORAL
Refills: 0 | Status: DISCONTINUED | COMMUNITY
Start: 2021-11-15 | End: 2021-11-15

## 2021-11-17 RX ORDER — EPINEPHRINE 0.3 MG/.3ML
0.3 INJECTION INTRAMUSCULAR
Refills: 0 | Status: DISCONTINUED | COMMUNITY
Start: 2021-11-15 | End: 2021-11-17

## 2022-01-03 ENCOUNTER — NON-APPOINTMENT (OUTPATIENT)
Age: 87
End: 2022-01-03

## 2022-01-07 ENCOUNTER — APPOINTMENT (OUTPATIENT)
Dept: HOME HEALTH SERVICES | Facility: HOME HEALTH | Age: 87
End: 2022-01-07

## 2022-01-18 ENCOUNTER — APPOINTMENT (OUTPATIENT)
Dept: HOME HEALTH SERVICES | Facility: HOME HEALTH | Age: 87
End: 2022-01-18
Payer: MEDICARE

## 2022-01-18 ENCOUNTER — NON-APPOINTMENT (OUTPATIENT)
Age: 87
End: 2022-01-18

## 2022-01-18 VITALS
TEMPERATURE: 99 F | SYSTOLIC BLOOD PRESSURE: 140 MMHG | RESPIRATION RATE: 16 BRPM | HEART RATE: 91 BPM | OXYGEN SATURATION: 99 % | DIASTOLIC BLOOD PRESSURE: 80 MMHG

## 2022-01-18 DIAGNOSIS — Z92.29 PERSONAL HISTORY OF OTHER DRUG THERAPY: ICD-10-CM

## 2022-01-18 DIAGNOSIS — Z23 ENCOUNTER FOR IMMUNIZATION: ICD-10-CM

## 2022-01-18 DIAGNOSIS — J02.9 ACUTE PHARYNGITIS, UNSPECIFIED: ICD-10-CM

## 2022-01-18 PROCEDURE — 99349 HOME/RES VST EST MOD MDM 40: CPT

## 2022-01-26 PROBLEM — J02.9 SORE THROAT: Status: RESOLVED | Noted: 2022-01-26 | Resolved: 2022-02-25

## 2022-01-26 PROBLEM — Z92.29 HISTORY OF INFLUENZA VACCINATION: Status: RESOLVED | Noted: 2019-11-06 | Resolved: 2022-01-26

## 2022-01-26 PROBLEM — Z23 NEED FOR COVID-19 VACCINE: Status: RESOLVED | Noted: 2021-11-15 | Resolved: 2022-01-26

## 2022-01-26 LAB — SARS-COV-2 N GENE NPH QL NAA+PROBE: NOT DETECTED

## 2022-01-26 NOTE — HISTORY OF PRESENT ILLNESS
[Patient] : patient [Spouse] : spouse [Family Member] : family member [FreeTextEntry1] : unsteady gait [FreeTextEntry2] : Patient denies fever, cough, trouble breathing, rash, vomiting and diarrhea. Patient has not been in close contact with someone covid positive. \par N95 mask, gloves, eye wear  used during visit: [Y ]. Total face to face time with patient is 35 min.\par \par PMH:  hypertension, osteoporosis, mild intermittent asthma, environmental allergies \par \par Interval events: none\par \par Pt A&Ox3; reports sore throat, cough & ear pain and "feels warm"- s/s started over the weekend- has not been using asthma medications as they increase sore throat- still has not followed up w/ pulmonologist as recommended numerous times\par Appetite has been decreased but weight is stable\par Moving bowels well \par \par Osteoporosis -was on risedronate but it was stopped in 10/19 due to increased bone pain- rx for  have DEXA scan given but not done yet\par \par Asthma: as above; on DuoNeb, Ventolin inhalers, Advair but non- adherent\par \par HTN: on hydrochlorothiazide\par

## 2022-01-26 NOTE — REVIEW OF SYSTEMS
[Earache] : earache [Sore Throat] : sore throat [Joint Pain] : joint pain [Unsteady Walking] : ataxia [Negative] : Heme/Lymph [Headache] : no headache [Dizziness] : no dizziness [Fainting] : no fainting [Confusion] : no confusion [Memory Loss] : no memory loss [FreeTextEntry2] : as noted in HPI [FreeTextEntry6] : as noted in HPI

## 2022-01-26 NOTE — COUNSELING
[Non - Smoker] : non-smoker [] : foot exam [Full Code] : Code Status: Full Code [Limited] : Treatment Guidelines: Limited [Trial of Intubation] : Intubation: Trial of Intubation [[facility] Date: _____] : [facility] Com[facility]/last verification date: [unfilled] [_____] : HCP: [unfilled]

## 2022-01-26 NOTE — PHYSICAL EXAM
[No Acute Distress] : no acute distress [Well Nourished] : well nourished [Well Developed] : well developed [Normal Sclera/Conjunctiva] : normal sclera/conjunctiva [PERRL] : pupils equal, round and reactive to light [EOMI] : extra ocular movement intact [Normal Outer Ear/Nose] : the ears and nose were normal in appearance [Normal TMs] : both tympanic membranes were normal [No JVD] : no jugular venous distention [Supple] : the neck was supple [No Respiratory Distress] : no respiratory distress [Clear to Auscultation] : lungs were clear to auscultation bilaterally [No Accessory Muscle Use] : no accessory muscle use [Normal Rate] : heart rate was normal  [Regular Rhythm] : with a regular rhythm [Normal S1, S2] : normal S1 and S2 [No Murmurs] : no murmurs heard [No Edema] : there was no peripheral edema [Normal Bowel Sounds] : normal bowel sounds [Non Tender] : non-tender [Soft] : abdomen soft [Not Distended] : not distended [No CVA Tenderness] : no ~M costovertebral angle tenderness [No Spinal Tenderness] : no spinal tenderness [No Joint Swelling] : no joint swelling seen [No Clubbing, Cyanosis] : no clubbing  or cyanosis of the fingernails [Normal Strength/Tone] : muscle strength and tone were normal [No Rash] : no rash [No Skin Lesions] : no skin lesions [Oriented x3] : oriented to person, place, and time [Normal Affect] : the affect was normal [Kyphosis] : no kyphosis present [Scoliosis] : scoliosis not present [de-identified] : looks tired, talkative, Macedonian speaking [de-identified] : throat reddened [de-identified] : bilateral LE darkened with venous changes

## 2022-01-26 NOTE — CURRENT MEDS
[Medication and Allergies Reconciled] : medication and allergies reconciled [Reviewed patient reported medication adherence from Comprehensive Assessment] : Reviewed patient reported medication adherence from comprehensive assessment [Adherent to medications] : Patient is adherent to medications as prescribed [de-identified] : managed by daughter

## 2022-03-01 ENCOUNTER — NON-APPOINTMENT (OUTPATIENT)
Age: 87
End: 2022-03-01

## 2022-03-04 ENCOUNTER — RX RENEWAL (OUTPATIENT)
Age: 87
End: 2022-03-04

## 2022-03-04 ENCOUNTER — APPOINTMENT (OUTPATIENT)
Dept: HOME HEALTH SERVICES | Facility: HOME HEALTH | Age: 87
End: 2022-03-04

## 2022-03-07 ENCOUNTER — NON-APPOINTMENT (OUTPATIENT)
Age: 87
End: 2022-03-07

## 2022-04-11 ENCOUNTER — NON-APPOINTMENT (OUTPATIENT)
Age: 87
End: 2022-04-11

## 2022-04-19 ENCOUNTER — NON-APPOINTMENT (OUTPATIENT)
Age: 87
End: 2022-04-19

## 2022-04-26 ENCOUNTER — APPOINTMENT (OUTPATIENT)
Dept: HOME HEALTH SERVICES | Facility: HOME HEALTH | Age: 87
End: 2022-04-26
Payer: MEDICARE

## 2022-04-26 VITALS
TEMPERATURE: 98 F | HEART RATE: 86 BPM | DIASTOLIC BLOOD PRESSURE: 60 MMHG | OXYGEN SATURATION: 98 % | RESPIRATION RATE: 16 BRPM | SYSTOLIC BLOOD PRESSURE: 120 MMHG

## 2022-04-26 DIAGNOSIS — M17.0 BILATERAL PRIMARY OSTEOARTHRITIS OF KNEE: ICD-10-CM

## 2022-04-26 DIAGNOSIS — J30.2 OTHER SEASONAL ALLERGIC RHINITIS: ICD-10-CM

## 2022-04-26 PROCEDURE — 99349 HOME/RES VST EST MOD MDM 40: CPT

## 2022-04-26 RX ORDER — AZITHROMYCIN 250 MG/1
250 TABLET, FILM COATED ORAL
Qty: 1 | Refills: 0 | Status: DISCONTINUED | COMMUNITY
Start: 2022-04-11 | End: 2022-04-26

## 2022-04-26 RX ORDER — BUDESONIDE 0.5 MG/2ML
0.5 INHALANT ORAL TWICE DAILY
Qty: 1 | Refills: 0 | Status: ACTIVE | COMMUNITY
Start: 2022-04-26

## 2022-04-26 NOTE — LETTER HEADER
----- Message from Tony Canales sent at 3/20/2017  8:37 AM CDT -----  Pt called asking for a call back from the nurse regarding her medication/pls call back [phone]   [Care Plan reviewed and provided to patient/caregiver] : Care plan reviewed and provided to patient/caregiver [Care Plan reviewed every ___ weeks] : Care plan reviewed every [unfilled] weeks [Care Plan managed/Care coordinated by: ___] : Care plan managed/Care coordinated by: [unfilled] [Initiation or substantial revisions made to care plan involving mod/high medical decision making for complex CCM] : Initiation or substantial revisions made to care plan involving mod/high medical decision making for complex CCM [Patient/Caregiver agrees to have other providers send summary of their care to this office] : Patient/caregiver agrees to have other providers send summary of their care to this office

## 2022-05-02 PROBLEM — M17.0 PRIMARY LOCALIZED OSTEOARTHRITIS OF BOTH KNEES: Status: ACTIVE | Noted: 2019-08-06

## 2022-05-02 PROBLEM — J30.2 SEASONAL ALLERGIES: Status: ACTIVE | Noted: 2021-09-08

## 2022-05-02 RX ORDER — ALBUTEROL SULFATE 90 UG/1
108 (90 BASE) AEROSOL, METERED RESPIRATORY (INHALATION)
Qty: 1 | Refills: 3 | Status: DISCONTINUED | COMMUNITY
Start: 2019-05-24 | End: 2022-05-02

## 2022-05-02 NOTE — CURRENT MEDS
[Medication and Allergies Reconciled] : medication and allergies reconciled [Reviewed patient reported medication adherence from Comprehensive Assessment] : Reviewed patient reported medication adherence from comprehensive assessment [Adherent to medications] : Patient is adherent to medications as prescribed [de-identified] : managed by daughter

## 2022-05-02 NOTE — HISTORY OF PRESENT ILLNESS
[Patient] : patient [Spouse] : spouse [Family Member] : family member [FreeTextEntry1] : unsteady gait [FreeTextEntry2] : Patient denies fever, cough, trouble breathing, rash, vomiting and diarrhea. Patient has not been in close contact with someone covid positive. \par N95 mask, gloves, eye wear  used during visit: [Y ]. Total face to face time with patient is 35 min.\par \par PMH:  hypertension, osteoporosis, mild intermittent asthma, environmental allergies \par \par A&Ox3 still w/ chronic c/o sore throat, runny nose, took Zpack starting 4/11 with minimal effect; taking Xyzal with minimal effect but not taking every day- \par Has Flonase - but not using it\par Ran out of Singulair\par Lower lip swollen over one week- no difficulty swallowing \par Appetite has been decreased but weight is stable\par Moving bowels well \par \par Asthma: as above; on albuterol nebs, Ventolin inhalers, Advair - saw Pulm- started on Budenoside- does not coincide w/ swollen lip\par \par Osteoporosis -was on risedronate but it was stopped in 10/19 due to increased bone pain- \par \par HTN: on hydrochlorothiazide\par

## 2022-05-02 NOTE — PHYSICAL EXAM
[No Acute Distress] : no acute distress [Well Nourished] : well nourished [Well Developed] : well developed [Normal Sclera/Conjunctiva] : normal sclera/conjunctiva [PERRL] : pupils equal, round and reactive to light [EOMI] : extra ocular movement intact [Normal Outer Ear/Nose] : the ears and nose were normal in appearance [Normal TMs] : both tympanic membranes were normal [No JVD] : no jugular venous distention [Supple] : the neck was supple [No Respiratory Distress] : no respiratory distress [Clear to Auscultation] : lungs were clear to auscultation bilaterally [No Accessory Muscle Use] : no accessory muscle use [Normal Rate] : heart rate was normal  [Regular Rhythm] : with a regular rhythm [Normal S1, S2] : normal S1 and S2 [No Murmurs] : no murmurs heard [No Edema] : there was no peripheral edema [Normal Bowel Sounds] : normal bowel sounds [Non Tender] : non-tender [Soft] : abdomen soft [Not Distended] : not distended [No CVA Tenderness] : no ~M costovertebral angle tenderness [No Spinal Tenderness] : no spinal tenderness [No Joint Swelling] : no joint swelling seen [No Clubbing, Cyanosis] : no clubbing  or cyanosis of the fingernails [Normal Strength/Tone] : muscle strength and tone were normal [No Rash] : no rash [No Skin Lesions] : no skin lesions [Oriented x3] : oriented to person, place, and time [Normal Affect] : the affect was normal [Kyphosis] : no kyphosis present [Scoliosis] : scoliosis not present [de-identified] : looks tired, talkative, Yoruba speaking [de-identified] : throat reddened [de-identified] : bilateral LE darkened with venous changes

## 2022-05-04 ENCOUNTER — LABORATORY RESULT (OUTPATIENT)
Age: 87
End: 2022-05-04

## 2022-05-05 LAB
25(OH)D3 SERPL-MCNC: 32.6 NG/ML
ALBUMIN SERPL ELPH-MCNC: 4.3 G/DL
ALP BLD-CCNC: 67 U/L
ALT SERPL-CCNC: 15 U/L
ANION GAP SERPL CALC-SCNC: 14 MMOL/L
AST SERPL-CCNC: 16 U/L
BASOPHILS # BLD AUTO: 0.04 K/UL
BASOPHILS NFR BLD AUTO: 0.7 %
BILIRUB SERPL-MCNC: 0.2 MG/DL
BUN SERPL-MCNC: 33 MG/DL
CALCIUM SERPL-MCNC: 9.9 MG/DL
CHLORIDE SERPL-SCNC: 100 MMOL/L
CO2 SERPL-SCNC: 25 MMOL/L
CREAT SERPL-MCNC: 1.11 MG/DL
EGFR: 48 ML/MIN/1.73M2
EOSINOPHIL # BLD AUTO: 0.21 K/UL
EOSINOPHIL NFR BLD AUTO: 3.4 %
ESTIMATED AVERAGE GLUCOSE: 100 MG/DL
FOLATE SERPL-MCNC: 10.2 NG/ML
GLUCOSE SERPL-MCNC: 99 MG/DL
HBA1C MFR BLD HPLC: 5.1 %
HCT VFR BLD CALC: 38.2 %
HGB BLD-MCNC: 12.5 G/DL
IMM GRANULOCYTES NFR BLD AUTO: 0.7 %
LYMPHOCYTES # BLD AUTO: 1.78 K/UL
LYMPHOCYTES NFR BLD AUTO: 29 %
MAN DIFF?: NORMAL
MCHC RBC-ENTMCNC: 30.2 PG
MCHC RBC-ENTMCNC: 32.7 GM/DL
MCV RBC AUTO: 92.3 FL
MONOCYTES # BLD AUTO: 0.55 K/UL
MONOCYTES NFR BLD AUTO: 9 %
NEUTROPHILS # BLD AUTO: 3.52 K/UL
NEUTROPHILS NFR BLD AUTO: 57.2 %
PLATELET # BLD AUTO: 280 K/UL
POTASSIUM SERPL-SCNC: 4.6 MMOL/L
PROT SERPL-MCNC: 6.4 G/DL
RBC # BLD: 4.14 M/UL
RBC # FLD: 13.2 %
SODIUM SERPL-SCNC: 139 MMOL/L
TSH SERPL-ACNC: 1.98 UIU/ML
VIT B12 SERPL-MCNC: 536 PG/ML
WBC # FLD AUTO: 6.14 K/UL

## 2022-05-06 ENCOUNTER — NON-APPOINTMENT (OUTPATIENT)
Age: 87
End: 2022-05-06

## 2022-06-03 ENCOUNTER — NON-APPOINTMENT (OUTPATIENT)
Age: 87
End: 2022-06-03

## 2022-06-06 ENCOUNTER — APPOINTMENT (OUTPATIENT)
Dept: HOME HEALTH SERVICES | Facility: HOME HEALTH | Age: 87
End: 2022-06-06

## 2022-07-29 ENCOUNTER — LABORATORY RESULT (OUTPATIENT)
Age: 87
End: 2022-07-29

## 2022-08-02 ENCOUNTER — NON-APPOINTMENT (OUTPATIENT)
Age: 87
End: 2022-08-02

## 2022-08-19 ENCOUNTER — NON-APPOINTMENT (OUTPATIENT)
Age: 87
End: 2022-08-19

## 2022-08-25 ENCOUNTER — LABORATORY RESULT (OUTPATIENT)
Age: 87
End: 2022-08-25

## 2022-08-25 ENCOUNTER — APPOINTMENT (OUTPATIENT)
Dept: HOME HEALTH SERVICES | Facility: HOME HEALTH | Age: 87
End: 2022-08-25

## 2022-08-30 ENCOUNTER — NON-APPOINTMENT (OUTPATIENT)
Age: 87
End: 2022-08-30

## 2022-09-02 ENCOUNTER — APPOINTMENT (OUTPATIENT)
Dept: HOME HEALTH SERVICES | Facility: HOME HEALTH | Age: 87
End: 2022-09-02

## 2022-09-02 VITALS
OXYGEN SATURATION: 99 % | HEART RATE: 82 BPM | SYSTOLIC BLOOD PRESSURE: 118 MMHG | DIASTOLIC BLOOD PRESSURE: 70 MMHG | RESPIRATION RATE: 16 BRPM | TEMPERATURE: 98 F

## 2022-09-02 DIAGNOSIS — J30.2 OTHER SEASONAL ALLERGIC RHINITIS: ICD-10-CM

## 2022-09-02 DIAGNOSIS — Z87.09 PERSONAL HISTORY OF OTHER DISEASES OF THE RESPIRATORY SYSTEM: ICD-10-CM

## 2022-09-02 PROCEDURE — 99349 HOME/RES VST EST MOD MDM 40: CPT | Mod: 25

## 2022-09-02 PROCEDURE — 90662 IIV NO PRSV INCREASED AG IM: CPT

## 2022-09-02 PROCEDURE — G0008: CPT

## 2022-09-08 PROBLEM — J30.2 SEASONAL ALLERGIES: Status: RESOLVED | Noted: 2022-04-26 | Resolved: 2022-09-08

## 2022-09-08 PROBLEM — Z87.09 HISTORY OF SORE THROAT: Status: RESOLVED | Noted: 2022-08-25 | Resolved: 2022-09-08

## 2022-09-08 NOTE — PHYSICAL EXAM
[No Acute Distress] : no acute distress [Well Nourished] : well nourished [Well Developed] : well developed [Normal Sclera/Conjunctiva] : normal sclera/conjunctiva [PERRL] : pupils equal, round and reactive to light [EOMI] : extra ocular movement intact [Normal Outer Ear/Nose] : the ears and nose were normal in appearance [Normal TMs] : both tympanic membranes were normal [No JVD] : no jugular venous distention [Supple] : the neck was supple [No Respiratory Distress] : no respiratory distress [Clear to Auscultation] : lungs were clear to auscultation bilaterally [No Accessory Muscle Use] : no accessory muscle use [Normal Rate] : heart rate was normal  [Regular Rhythm] : with a regular rhythm [Normal S1, S2] : normal S1 and S2 [No Murmurs] : no murmurs heard [No Edema] : there was no peripheral edema [Normal Bowel Sounds] : normal bowel sounds [Non Tender] : non-tender [Soft] : abdomen soft [Not Distended] : not distended [No CVA Tenderness] : no ~M costovertebral angle tenderness [No Spinal Tenderness] : no spinal tenderness [No Joint Swelling] : no joint swelling seen [No Clubbing, Cyanosis] : no clubbing  or cyanosis of the fingernails [Normal Strength/Tone] : muscle strength and tone were normal [No Rash] : no rash [No Skin Lesions] : no skin lesions [Oriented x3] : oriented to person, place, and time [Normal Affect] : the affect was normal [Normal Oropharynx] : the oropharynx was normal [Kyphosis] : no kyphosis present [Scoliosis] : scoliosis not present [de-identified] : looks well, talkative, Peruvian speaking [de-identified] : bilateral LE darkened with venous changes

## 2022-09-08 NOTE — COUNSELING
[Non - Smoker] : non-smoker [] : foot exam [Full Code] : Code Status: Full Code [Limited] : Treatment Guidelines: Limited [Trial of Intubation] : Intubation: Trial of Intubation [[facility]: _____[facility]: [unfilled] [_____] : HCP: [unfilled]

## 2022-09-08 NOTE — CURRENT MEDS
[Medication and Allergies Reconciled] : medication and allergies reconciled [Reviewed patient reported medication adherence from Comprehensive Assessment] : Reviewed patient reported medication adherence from comprehensive assessment [Adherent to medications] : Patient is adherent to medications as prescribed [de-identified] : managed by daughter

## 2022-09-08 NOTE — HISTORY OF PRESENT ILLNESS
[Patient] : patient [Spouse] : spouse [Family Member] : family member [FreeTextEntry1] : unsteady gait [FreeTextEntry2] : Patient denies fever, cough, trouble breathing, rash, vomiting and diarrhea. Patient has not been in close contact with someone covid positive. \par N95 mask, gloves, eye wear  used during visit: [Y ]. Total face to face time with patient is 35 min.\par \par PMH:  hypertension, osteoporosis, mild intermittent asthma, environmental allergies \par \par A&Ox3  pt and daughter report \par Eyes still draining- saw opthalmology in past aware that this will always be an issue\par Still w/ leg pain- never had MATT’s- rx left in home \par Wants a new bisphosphonate although developed pain from risedronate never had bone density- rx left in home\par Appetite has been decreased but weight is stable\par Moving bowels well \par \par Asthma:  on albuterol nebs, Ventolin inhalers, Budenoside- \par \par Osteoporosis -as above; was on risedronate but it was stopped in 10/19 due to increased bone pain- \par \par HTN: on hydrochlorothiazide\par \par CKD: GFR 48 in 5/22\par

## 2022-09-08 NOTE — REVIEW OF SYSTEMS
[Joint Pain] : joint pain [Negative] : Respiratory [Headache] : no headache [Dizziness] : no dizziness [Fainting] : no fainting [Confusion] : no confusion [Memory Loss] : no memory loss [FreeTextEntry2] : as noted in HPI

## 2022-10-14 ENCOUNTER — APPOINTMENT (OUTPATIENT)
Dept: HOME HEALTH SERVICES | Facility: HOME HEALTH | Age: 87
End: 2022-10-14

## 2022-10-14 VITALS
TEMPERATURE: 97.7 F | SYSTOLIC BLOOD PRESSURE: 120 MMHG | OXYGEN SATURATION: 99 % | HEART RATE: 86 BPM | DIASTOLIC BLOOD PRESSURE: 60 MMHG | RESPIRATION RATE: 18 BRPM

## 2022-10-14 PROCEDURE — 99349 HOME/RES VST EST MOD MDM 40: CPT

## 2022-10-21 RX ORDER — PREDNISONE 20 MG/1
20 TABLET ORAL
Qty: 6 | Refills: 3 | Status: DISCONTINUED | COMMUNITY
Start: 2022-04-26 | End: 2022-10-21

## 2022-10-21 NOTE — PHYSICAL EXAM
[No Acute Distress] : no acute distress [Well Nourished] : well nourished [Well Developed] : well developed [Normal Sclera/Conjunctiva] : normal sclera/conjunctiva [PERRL] : pupils equal, round and reactive to light [EOMI] : extra ocular movement intact [Normal Outer Ear/Nose] : the ears and nose were normal in appearance [Normal Oropharynx] : the oropharynx was normal [Normal TMs] : both tympanic membranes were normal [No JVD] : no jugular venous distention [Supple] : the neck was supple [No Respiratory Distress] : no respiratory distress [Clear to Auscultation] : lungs were clear to auscultation bilaterally [No Accessory Muscle Use] : no accessory muscle use [Normal Rate] : heart rate was normal  [Regular Rhythm] : with a regular rhythm [Normal S1, S2] : normal S1 and S2 [No Murmurs] : no murmurs heard [No Edema] : there was no peripheral edema [Normal Bowel Sounds] : normal bowel sounds [Non Tender] : non-tender [Soft] : abdomen soft [Not Distended] : not distended [No CVA Tenderness] : no ~M costovertebral angle tenderness [No Spinal Tenderness] : no spinal tenderness [No Joint Swelling] : no joint swelling seen [No Clubbing, Cyanosis] : no clubbing  or cyanosis of the fingernails [Normal Strength/Tone] : muscle strength and tone were normal [No Rash] : no rash [No Skin Lesions] : no skin lesions [Oriented x3] : oriented to person, place, and time [Normal Affect] : the affect was normal [Breast Exam Declined] : patient declined to have breast exam done [Kyphosis] : no kyphosis present [Scoliosis] : scoliosis not present [de-identified] : looks well, talkative, Ghanaian speaking [de-identified] : bilateral LE darkened with venous changes

## 2022-10-21 NOTE — CURRENT MEDS
[Medication and Allergies Reconciled] : medication and allergies reconciled [Reviewed patient reported medication adherence from Comprehensive Assessment] : Reviewed patient reported medication adherence from comprehensive assessment [Adherent to medications] : Patient is adherent to medications as prescribed [de-identified] : managed by daughter

## 2022-10-21 NOTE — HISTORY OF PRESENT ILLNESS
[Patient] : patient [Spouse] : spouse [Family Member] : family member [FreeTextEntry1] : unsteady gait [FreeTextEntry2] : Patient denies fever, cough, trouble breathing, rash, vomiting and diarrhea. Patient has not been in close contact with someone covid positive. \par N95 mask, gloves, eye wear  used during visit: [Y ]. Total face to face time with patient is 40 min.\par \par PMH:  hypertension, osteoporosis, mild intermittent asthma, environmental allergies \par \par \par A&Ox3  pt and daughter report \par Pt reports vomiting x 2 days that has now subsided, eating well, reports having bowel moment, denies abdominal pain or tenderness, afebrile. advised to continue to monitor and to notify if vomiting again\par \par Asthma:  on albuterol nebs, Ventolin inhalers, Budenoside- \par \par Osteoporosis -as above; was on risedronate but it was stopped in 10/19 due to increased bone pain- \par \par HTN: on hydrochlorothiazide\par \par CKD: GFR 48 in 5/22\par

## 2022-10-21 NOTE — REVIEW OF SYSTEMS
[Joint Pain] : joint pain [Negative] : Neurological [Headache] : no headache [Dizziness] : no dizziness [Fainting] : no fainting [Confusion] : no confusion [Memory Loss] : no memory loss [FreeTextEntry2] : as noted in HPI

## 2022-11-01 ENCOUNTER — RX RENEWAL (OUTPATIENT)
Age: 87
End: 2022-11-01

## 2022-11-02 ENCOUNTER — RX RENEWAL (OUTPATIENT)
Age: 87
End: 2022-11-02

## 2022-12-09 ENCOUNTER — RX RENEWAL (OUTPATIENT)
Age: 87
End: 2022-12-09

## 2023-01-05 ENCOUNTER — NON-APPOINTMENT (OUTPATIENT)
Age: 88
End: 2023-01-05

## 2023-01-13 ENCOUNTER — APPOINTMENT (OUTPATIENT)
Dept: HOME HEALTH SERVICES | Facility: HOME HEALTH | Age: 88
End: 2023-01-13
Payer: MEDICARE

## 2023-01-13 VITALS
RESPIRATION RATE: 16 BRPM | TEMPERATURE: 99 F | OXYGEN SATURATION: 98 % | HEART RATE: 94 BPM | DIASTOLIC BLOOD PRESSURE: 70 MMHG | SYSTOLIC BLOOD PRESSURE: 130 MMHG

## 2023-01-13 DIAGNOSIS — Z20.822 CONTACT WITH AND (SUSPECTED) EXPOSURE TO COVID-19: ICD-10-CM

## 2023-01-13 DIAGNOSIS — Z87.898 PERSONAL HISTORY OF OTHER SPECIFIED CONDITIONS: ICD-10-CM

## 2023-01-13 PROCEDURE — 99349 HOME/RES VST EST MOD MDM 40: CPT

## 2023-01-16 ENCOUNTER — LABORATORY RESULT (OUTPATIENT)
Age: 88
End: 2023-01-16

## 2023-01-20 PROBLEM — Z20.822 CLOSE EXPOSURE TO COVID-19 VIRUS: Status: RESOLVED | Noted: 2022-08-25 | Resolved: 2023-01-20

## 2023-01-20 PROBLEM — Z87.898 HISTORY OF VOMITING: Status: RESOLVED | Noted: 2022-10-14 | Resolved: 2023-01-20

## 2023-01-20 NOTE — HISTORY OF PRESENT ILLNESS
[Patient] : patient [Spouse] : spouse [Family Member] : family member [FreeTextEntry1] : unsteady gait [FreeTextEntry2] : Patient denies fever, cough, trouble breathing, rash, vomiting and diarrhea. Patient has not been in close contact with someone covid positive. \par N95 mask, gloves, eye wear  used during visit: [Y ]. Total face to face time with patient is 35 min.\par \par PMH:  hypertension, osteoporosis, mild intermittent asthma, environmental allergies \par \par A&Ox3- c/o sore throat, nasal congestion cough for 1 week- clearing throat throughout visit- throat is red \par Still w/ leg pain- no swelling of legs, has venous staining\par Appetite better- weight is stable\par Moving bowels well \par \par Asthma:  on albuterol nebs, Ventolin inhalers, Budenoside- \par \par Osteoporosis -as above; was on risedronate but it was stopped in 10/19 due to increased bone pain- \par \par HTN: on hydrochlorothiazide\par \par CKD: GFR 48 in 5/22\par

## 2023-01-20 NOTE — CURRENT MEDS
[Medication and Allergies Reconciled] : medication and allergies reconciled [Reviewed patient reported medication adherence from Comprehensive Assessment] : Reviewed patient reported medication adherence from comprehensive assessment [Adherent to medications] : Patient is adherent to medications as prescribed [de-identified] : managed by daughter

## 2023-01-20 NOTE — REVIEW OF SYSTEMS
[Joint Pain] : joint pain [Negative] : Heme/Lymph [Hoarseness] : hoarseness [Sore Throat] : sore throat [Headache] : no headache [Dizziness] : no dizziness [Fainting] : no fainting [Confusion] : no confusion [Memory Loss] : no memory loss [FreeTextEntry2] : as noted in HPI

## 2023-01-20 NOTE — PHYSICAL EXAM
[No Acute Distress] : no acute distress [Well Nourished] : well nourished [Well Developed] : well developed [Normal Sclera/Conjunctiva] : normal sclera/conjunctiva [PERRL] : pupils equal, round and reactive to light [EOMI] : extra ocular movement intact [Normal Outer Ear/Nose] : the ears and nose were normal in appearance [Normal TMs] : both tympanic membranes were normal [No JVD] : no jugular venous distention [Supple] : the neck was supple [No Respiratory Distress] : no respiratory distress [Clear to Auscultation] : lungs were clear to auscultation bilaterally [No Accessory Muscle Use] : no accessory muscle use [Normal Rate] : heart rate was normal  [Regular Rhythm] : with a regular rhythm [Normal S1, S2] : normal S1 and S2 [No Murmurs] : no murmurs heard [No Edema] : there was no peripheral edema [Normal Bowel Sounds] : normal bowel sounds [Non Tender] : non-tender [Soft] : abdomen soft [Not Distended] : not distended [No CVA Tenderness] : no ~M costovertebral angle tenderness [No Spinal Tenderness] : no spinal tenderness [No Joint Swelling] : no joint swelling seen [No Clubbing, Cyanosis] : no clubbing  or cyanosis of the fingernails [Normal Strength/Tone] : muscle strength and tone were normal [No Rash] : no rash [No Skin Lesions] : no skin lesions [Oriented x3] : oriented to person, place, and time [Normal Affect] : the affect was normal [Kyphosis] : no kyphosis present [Scoliosis] : scoliosis not present [de-identified] : voice hoarse, throat red [de-identified] : bilateral LE darkened with venous changes

## 2023-02-21 ENCOUNTER — NON-APPOINTMENT (OUTPATIENT)
Age: 88
End: 2023-02-21

## 2023-02-22 ENCOUNTER — APPOINTMENT (OUTPATIENT)
Dept: HOME HEALTH SERVICES | Facility: HOME HEALTH | Age: 88
End: 2023-02-22

## 2023-02-27 ENCOUNTER — RX RENEWAL (OUTPATIENT)
Age: 88
End: 2023-02-27

## 2023-04-26 ENCOUNTER — NON-APPOINTMENT (OUTPATIENT)
Age: 88
End: 2023-04-26

## 2023-04-30 NOTE — COUNSELING
[Non - Smoker] : non-smoker [] : foot exam [Full Code] : Code Status: Full Code [Limited] : Treatment Guidelines: Limited [Trial of Intubation] : Intubation: Trial of Intubation [[facility] Date: _____] : [facility] Completion/last verification date: [unfilled] [_____] : HCP: [unfilled]

## 2023-05-01 ENCOUNTER — APPOINTMENT (OUTPATIENT)
Dept: HOME HEALTH SERVICES | Facility: HOME HEALTH | Age: 88
End: 2023-05-01
Payer: MEDICARE

## 2023-05-01 VITALS
DIASTOLIC BLOOD PRESSURE: 72 MMHG | TEMPERATURE: 98 F | HEART RATE: 80 BPM | RESPIRATION RATE: 17 BRPM | OXYGEN SATURATION: 98 % | SYSTOLIC BLOOD PRESSURE: 132 MMHG

## 2023-05-01 DIAGNOSIS — I87.8 OTHER SPECIFIED DISORDERS OF VEINS: ICD-10-CM

## 2023-05-01 PROCEDURE — 99349 HOME/RES VST EST MOD MDM 40: CPT

## 2023-05-01 RX ORDER — AZELASTINE HYDROCHLORIDE 0.5 MG/ML
0.05 SOLUTION/ DROPS OPHTHALMIC
Qty: 1 | Refills: 3 | Status: COMPLETED | COMMUNITY
Start: 2020-06-01 | End: 2023-05-01

## 2023-05-01 RX ORDER — KETOTIFEN FUMARATE 0.25 MG/ML
0.03 SOLUTION OPHTHALMIC
Qty: 1 | Refills: 0 | Status: ACTIVE | COMMUNITY
Start: 2021-09-08 | End: 1900-01-01

## 2023-05-01 RX ORDER — FLUTICASONE PROPIONATE 50 UG/1
50 SPRAY, METERED NASAL DAILY
Qty: 1 | Refills: 3 | Status: COMPLETED | COMMUNITY
Start: 2019-08-06 | End: 2023-05-01

## 2023-05-01 RX ORDER — TRIAMCINOLONE ACETONIDE 1 MG/G
0.1 CREAM TOPICAL 3 TIMES DAILY
Qty: 3 | Refills: 3 | Status: ACTIVE | COMMUNITY
Start: 2021-08-03 | End: 1900-01-01

## 2023-05-01 RX ORDER — AZITHROMYCIN 250 MG/1
250 TABLET, FILM COATED ORAL
Qty: 1 | Refills: 0 | Status: COMPLETED | COMMUNITY
Start: 2023-01-13 | End: 2023-05-01

## 2023-05-01 RX ORDER — ALBUTEROL SULFATE 2.5 MG/3ML
(2.5 MG/3ML) SOLUTION RESPIRATORY (INHALATION) EVERY 6 HOURS
Qty: 1 | Refills: 3 | Status: ACTIVE | COMMUNITY
Start: 2019-05-24

## 2023-05-02 NOTE — PHYSICAL EXAM
[No Acute Distress] : no acute distress [Well Nourished] : well nourished [Well Developed] : well developed [Normal Sclera/Conjunctiva] : normal sclera/conjunctiva [PERRL] : pupils equal, round and reactive to light [EOMI] : extra ocular movement intact [Normal Outer Ear/Nose] : the ears and nose were normal in appearance [Normal TMs] : both tympanic membranes were normal [No JVD] : no jugular venous distention [Supple] : the neck was supple [No Respiratory Distress] : no respiratory distress [Clear to Auscultation] : lungs were clear to auscultation bilaterally [No Accessory Muscle Use] : no accessory muscle use [Normal Rate] : heart rate was normal  [Regular Rhythm] : with a regular rhythm [Normal S1, S2] : normal S1 and S2 [No Murmurs] : no murmurs heard [No Edema] : there was no peripheral edema [Non Tender] : non-tender [Normal Bowel Sounds] : normal bowel sounds [Soft] : abdomen soft [Not Distended] : not distended [No CVA Tenderness] : no ~M costovertebral angle tenderness [No Spinal Tenderness] : no spinal tenderness [No Joint Swelling] : no joint swelling seen [No Clubbing, Cyanosis] : no clubbing  or cyanosis of the fingernails [Normal Strength/Tone] : muscle strength and tone were normal [No Rash] : no rash [Oriented x3] : oriented to person, place, and time [No Skin Lesions] : no skin lesions [Normal Affect] : the affect was normal [Kyphosis] : no kyphosis present [Scoliosis] : scoliosis not present [de-identified] : bilateral LE darkened with venous changes

## 2023-05-02 NOTE — CURRENT MEDS
[Medication and Allergies Reconciled] : medication and allergies reconciled [Reviewed patient reported medication adherence from Comprehensive Assessment] : Reviewed patient reported medication adherence from comprehensive assessment [Adherent to medications] : Patient is adherent to medications as prescribed [de-identified] : managed by daughter

## 2023-05-02 NOTE — HEALTH RISK ASSESSMENT
[Independent] : managing medications [Some assistance needed] : managing finances [No] : The patient does not have visual impairment [HRA Reviewed] : Health risk assessments reviewed [Two or more falls in past year] : Patient reported two or more falls in the past year [TimeGetUpGo] : 14

## 2023-05-02 NOTE — HISTORY OF PRESENT ILLNESS
[Patient] : patient [Spouse] : spouse [Family Member] : family member [FreeTextEntry1] : unsteady gait [FreeTextEntry2] : PMH:  hypertension, osteoporosis, mild intermittent asthma, environmental allergies \par \par A&Ox3\par -Seen pt for f/u visit. pt sitting comfortably in chair. Discussed plans with daughter Lili over the phone\par -Still w/ leg pain- +1 swelling of legs, has venous staining. Left leg has circular rash with redness- possibly fungal? Daughter and patient states sometimes both her legs swell up and it causes the patient to loss balance and fall. Pt had a fall beginning of April with no injury. will order bilateral lower extremity Doppler as requested by daughter.  \par -Appetite better- weight is stable\par -Moving bowels well \par \par Asthma:  on albuterol nebs, Ventolin inhalers, Budenoside- \par \par Osteoporosis -as above; was on risedronate but it was stopped in 10/19 due to increased bone pain- \par \par HTN: on hydrochlorothiazide\par \par CKD: GFR 60 in 1/20/23\par

## 2023-05-02 NOTE — REVIEW OF SYSTEMS
[Joint Pain] : joint pain [Negative] : Heme/Lymph [Fever] : no fever [Hoarseness] : no hoarseness [Sore Throat] : no sore throat [Headache] : no headache [Dizziness] : no dizziness [Fainting] : no fainting [Confusion] : no confusion [Memory Loss] : no memory loss [FreeTextEntry2] : as noted in HPI

## 2023-05-19 ENCOUNTER — NON-APPOINTMENT (OUTPATIENT)
Age: 88
End: 2023-05-19

## 2023-06-02 ENCOUNTER — NON-APPOINTMENT (OUTPATIENT)
Age: 88
End: 2023-06-02

## 2023-06-08 ENCOUNTER — APPOINTMENT (OUTPATIENT)
Dept: HOME HEALTH SERVICES | Facility: HOME HEALTH | Age: 88
End: 2023-06-08

## 2023-08-25 ENCOUNTER — NON-APPOINTMENT (OUTPATIENT)
Age: 88
End: 2023-08-25

## 2023-08-29 ENCOUNTER — NON-APPOINTMENT (OUTPATIENT)
Age: 88
End: 2023-08-29

## 2023-08-29 ENCOUNTER — APPOINTMENT (OUTPATIENT)
Dept: HOME HEALTH SERVICES | Facility: HOME HEALTH | Age: 88
End: 2023-08-29

## 2023-08-30 ENCOUNTER — LABORATORY RESULT (OUTPATIENT)
Age: 88
End: 2023-08-30

## 2023-09-08 ENCOUNTER — APPOINTMENT (OUTPATIENT)
Dept: HOME HEALTH SERVICES | Facility: HOME HEALTH | Age: 88
End: 2023-09-08

## 2023-09-21 ENCOUNTER — RX RENEWAL (OUTPATIENT)
Age: 88
End: 2023-09-21

## 2023-09-21 RX ORDER — HYDROCHLOROTHIAZIDE 25 MG/1
25 TABLET ORAL
Qty: 90 | Refills: 3 | Status: ACTIVE | COMMUNITY
Start: 2019-05-24 | End: 1900-01-01

## 2023-09-21 RX ORDER — MONTELUKAST 10 MG/1
10 TABLET, FILM COATED ORAL DAILY
Qty: 90 | Refills: 3 | Status: ACTIVE | COMMUNITY
Start: 2019-05-24 | End: 1900-01-01

## 2023-10-26 ENCOUNTER — APPOINTMENT (OUTPATIENT)
Dept: HOME HEALTH SERVICES | Facility: HOME HEALTH | Age: 88
End: 2023-10-26
Payer: MEDICARE

## 2023-10-26 VITALS
HEART RATE: 78 BPM | DIASTOLIC BLOOD PRESSURE: 70 MMHG | TEMPERATURE: 98.1 F | RESPIRATION RATE: 16 BRPM | SYSTOLIC BLOOD PRESSURE: 130 MMHG | OXYGEN SATURATION: 99 %

## 2023-10-26 DIAGNOSIS — Z23 ENCOUNTER FOR IMMUNIZATION: ICD-10-CM

## 2023-10-26 DIAGNOSIS — J06.9 ACUTE UPPER RESPIRATORY INFECTION, UNSPECIFIED: ICD-10-CM

## 2023-10-26 DIAGNOSIS — I87.2 VENOUS INSUFFICIENCY (CHRONIC) (PERIPHERAL): ICD-10-CM

## 2023-10-26 PROCEDURE — 99349 HOME/RES VST EST MOD MDM 40: CPT | Mod: 25

## 2023-10-26 PROCEDURE — G0008: CPT

## 2023-10-26 PROCEDURE — 90662 IIV NO PRSV INCREASED AG IM: CPT

## 2023-11-01 PROBLEM — I87.2 VENOUS STASIS DERMATITIS OF LOWER EXTREMITY: Status: ACTIVE | Noted: 2023-10-26

## 2023-11-01 PROBLEM — J06.9 ACUTE UPPER RESPIRATORY INFECTION: Status: RESOLVED | Noted: 2019-10-28 | Resolved: 2023-11-01

## 2023-11-01 PROBLEM — Z23 ENCOUNTER FOR IMMUNIZATION: Status: ACTIVE | Noted: 2021-10-29 | Resolved: 2023-11-09

## 2023-11-03 ENCOUNTER — TRANSCRIPTION ENCOUNTER (OUTPATIENT)
Age: 88
End: 2023-11-03

## 2023-11-03 ENCOUNTER — EMERGENCY (EMERGENCY)
Facility: HOSPITAL | Age: 88
LOS: 1 days | Discharge: HOME | End: 2023-11-03
Attending: EMERGENCY MEDICINE
Payer: MEDICARE

## 2023-11-03 VITALS
SYSTOLIC BLOOD PRESSURE: 150 MMHG | DIASTOLIC BLOOD PRESSURE: 75 MMHG | HEART RATE: 96 BPM | WEIGHT: 134.92 LBS | OXYGEN SATURATION: 99 % | HEIGHT: 61 IN | RESPIRATION RATE: 18 BRPM | TEMPERATURE: 97 F

## 2023-11-03 VITALS
TEMPERATURE: 98 F | OXYGEN SATURATION: 96 % | HEART RATE: 86 BPM | DIASTOLIC BLOOD PRESSURE: 73 MMHG | RESPIRATION RATE: 17 BRPM | SYSTOLIC BLOOD PRESSURE: 144 MMHG

## 2023-11-03 LAB
ALBUMIN SERPL ELPH-MCNC: 4.2 G/DL — SIGNIFICANT CHANGE UP (ref 3.3–5)
ALP SERPL-CCNC: 57 U/L — SIGNIFICANT CHANGE UP (ref 40–120)
ALT FLD-CCNC: 15 U/L — SIGNIFICANT CHANGE UP (ref 10–45)
ANION GAP SERPL CALC-SCNC: 16 MMOL/L — SIGNIFICANT CHANGE UP (ref 5–17)
APTT BLD: 29.4 SEC — SIGNIFICANT CHANGE UP (ref 24.5–35.6)
AST SERPL-CCNC: 23 U/L — SIGNIFICANT CHANGE UP (ref 10–40)
BASOPHILS # BLD AUTO: 0.05 K/UL — SIGNIFICANT CHANGE UP (ref 0–0.2)
BASOPHILS NFR BLD AUTO: 0.7 % — SIGNIFICANT CHANGE UP (ref 0–2)
BILIRUB SERPL-MCNC: 0.4 MG/DL — SIGNIFICANT CHANGE UP (ref 0.2–1.2)
BUN SERPL-MCNC: 27 MG/DL — HIGH (ref 7–23)
CALCIUM SERPL-MCNC: 10 MG/DL — SIGNIFICANT CHANGE UP (ref 8.4–10.5)
CHLORIDE SERPL-SCNC: 103 MMOL/L — SIGNIFICANT CHANGE UP (ref 96–108)
CO2 SERPL-SCNC: 21 MMOL/L — LOW (ref 22–31)
CREAT SERPL-MCNC: 0.98 MG/DL — SIGNIFICANT CHANGE UP (ref 0.5–1.3)
EGFR: 56 ML/MIN/1.73M2 — LOW
EOSINOPHIL # BLD AUTO: 0.12 K/UL — SIGNIFICANT CHANGE UP (ref 0–0.5)
EOSINOPHIL NFR BLD AUTO: 1.7 % — SIGNIFICANT CHANGE UP (ref 0–6)
GLUCOSE SERPL-MCNC: 93 MG/DL — SIGNIFICANT CHANGE UP (ref 70–99)
HCT VFR BLD CALC: 35.1 % — SIGNIFICANT CHANGE UP (ref 34.5–45)
HGB BLD-MCNC: 11.9 G/DL — SIGNIFICANT CHANGE UP (ref 11.5–15.5)
IMM GRANULOCYTES NFR BLD AUTO: 0.6 % — SIGNIFICANT CHANGE UP (ref 0–0.9)
INR BLD: 0.98 RATIO — SIGNIFICANT CHANGE UP (ref 0.85–1.18)
LYMPHOCYTES # BLD AUTO: 1.12 K/UL — SIGNIFICANT CHANGE UP (ref 1–3.3)
LYMPHOCYTES # BLD AUTO: 16.2 % — SIGNIFICANT CHANGE UP (ref 13–44)
MCHC RBC-ENTMCNC: 31.4 PG — SIGNIFICANT CHANGE UP (ref 27–34)
MCHC RBC-ENTMCNC: 33.9 GM/DL — SIGNIFICANT CHANGE UP (ref 32–36)
MCV RBC AUTO: 92.6 FL — SIGNIFICANT CHANGE UP (ref 80–100)
MONOCYTES # BLD AUTO: 0.45 K/UL — SIGNIFICANT CHANGE UP (ref 0–0.9)
MONOCYTES NFR BLD AUTO: 6.5 % — SIGNIFICANT CHANGE UP (ref 2–14)
NEUTROPHILS # BLD AUTO: 5.13 K/UL — SIGNIFICANT CHANGE UP (ref 1.8–7.4)
NEUTROPHILS NFR BLD AUTO: 74.3 % — SIGNIFICANT CHANGE UP (ref 43–77)
NRBC # BLD: 0 /100 WBCS — SIGNIFICANT CHANGE UP (ref 0–0)
PLATELET # BLD AUTO: 238 K/UL — SIGNIFICANT CHANGE UP (ref 150–400)
POTASSIUM SERPL-MCNC: 3.5 MMOL/L — SIGNIFICANT CHANGE UP (ref 3.5–5.3)
POTASSIUM SERPL-SCNC: 3.5 MMOL/L — SIGNIFICANT CHANGE UP (ref 3.5–5.3)
PROT SERPL-MCNC: 6.8 G/DL — SIGNIFICANT CHANGE UP (ref 6–8.3)
PROTHROM AB SERPL-ACNC: 10.8 SEC — SIGNIFICANT CHANGE UP (ref 9.5–13)
RBC # BLD: 3.79 M/UL — LOW (ref 3.8–5.2)
RBC # FLD: 12.1 % — SIGNIFICANT CHANGE UP (ref 10.3–14.5)
SODIUM SERPL-SCNC: 140 MMOL/L — SIGNIFICANT CHANGE UP (ref 135–145)
WBC # BLD: 6.91 K/UL — SIGNIFICANT CHANGE UP (ref 3.8–10.5)
WBC # FLD AUTO: 6.91 K/UL — SIGNIFICANT CHANGE UP (ref 3.8–10.5)

## 2023-11-03 PROCEDURE — 72125 CT NECK SPINE W/O DYE: CPT | Mod: 26,MA

## 2023-11-03 PROCEDURE — 74176 CT ABD & PELVIS W/O CONTRAST: CPT | Mod: 26,MA

## 2023-11-03 PROCEDURE — 99284 EMERGENCY DEPT VISIT MOD MDM: CPT

## 2023-11-03 PROCEDURE — 74176 CT ABD & PELVIS W/O CONTRAST: CPT | Mod: MA

## 2023-11-03 PROCEDURE — 70450 CT HEAD/BRAIN W/O DYE: CPT | Mod: MA

## 2023-11-03 PROCEDURE — 71250 CT THORAX DX C-: CPT | Mod: MA

## 2023-11-03 PROCEDURE — 85025 COMPLETE CBC W/AUTO DIFF WBC: CPT

## 2023-11-03 PROCEDURE — 80053 COMPREHEN METABOLIC PANEL: CPT

## 2023-11-03 PROCEDURE — 71250 CT THORAX DX C-: CPT | Mod: 26,MA

## 2023-11-03 PROCEDURE — 70450 CT HEAD/BRAIN W/O DYE: CPT | Mod: 26,MA

## 2023-11-03 PROCEDURE — 93005 ELECTROCARDIOGRAM TRACING: CPT

## 2023-11-03 PROCEDURE — 85730 THROMBOPLASTIN TIME PARTIAL: CPT

## 2023-11-03 PROCEDURE — 96374 THER/PROPH/DIAG INJ IV PUSH: CPT

## 2023-11-03 PROCEDURE — 85610 PROTHROMBIN TIME: CPT

## 2023-11-03 PROCEDURE — 99285 EMERGENCY DEPT VISIT HI MDM: CPT | Mod: 25

## 2023-11-03 PROCEDURE — 72125 CT NECK SPINE W/O DYE: CPT | Mod: MA

## 2023-11-03 RX ORDER — ACETAMINOPHEN 500 MG
1000 TABLET ORAL ONCE
Refills: 0 | Status: COMPLETED | OUTPATIENT
Start: 2023-11-03 | End: 2023-11-03

## 2023-11-03 RX ADMIN — Medication 400 MILLIGRAM(S): at 16:07

## 2023-11-03 NOTE — ED CLERICAL - DIVISION
[facility]... [facility]... [facility]...

## 2023-11-03 NOTE — ED CLERICAL - NS ED CLERK NOTE PRE-ARRIVAL INFORMATION; ADDITIONAL PRE-ARRIVAL INFORMATION
This patient is enrolled in the [facility] Program and receives comprehensive home-based primary care.    - To obtain additional clinical information , or to discuss any questions or concerns, you can call the [facility] team at [phone], 24 hours a day.    - If discharged, this patient will be followed up by the [facility] team within 2 days.    - If this patient requires admission, please use the hospitalist service. This patient is enrolled in the [facility] Program and receives comprehensive home-based primary care.    - To obtain additional clinical information , or to discuss any questions or concerns, you can call the [facility] team at [phone], 24 hours a day.    - If discharged, this patient will be followed up by the [facility] team within 2 days.    - If this patient requires admission, please use the hospitalist service. This patient is enrolled in the [facility] Program and receives comprehensive home-based primary care.    - To obtain additional clinical information , or to discuss any questions or concerns, you can call the [facility] team at [phone], 24 hours a day.    - If discharged, this patient will be followed up by the [facility] team within 2 days.    - If this patient requires admission, please use the hospitalist service.

## 2023-11-03 NOTE — ED PROVIDER NOTE - CHILD ABUSE FACILITY
[facility]

## 2023-11-03 NOTE — ED PROVIDER NOTE - PROGRESS NOTE DETAILS
Jaime Ibrahim MD, PGY2  labs and imaging non actionable. No ICH or fractures noted. Findings discussed with patient and patients daughter. Patient able to ambulate in ED. Will dc home. Daughter will take her home. Pt in agreement with plan. Will advise tylenol for pain. Answered all questions and gave return precautions.

## 2023-11-03 NOTE — ED PROVIDER NOTE - CLINICAL SUMMARY MEDICAL DECISION MAKING FREE TEXT BOX
Jaime Ibrahim MD, PGY2  88-year-old female with past medical history of asthma, hypertension, osteoporosis presenting to emergency department status post unwitnessed fall while patient was in the kitchen.  Patient accompanied by daughter who states she heard her mother at the ground, went to see her immediately and upon finding her patient was alert and oriented, complaining of pain to the back of her scalp.  No blood or abrasions noticed on the head.  Unable to ambulate secondary to pain at the time.  EMS was called and brought to emergency department.  Patient has not received any medications since fall.  No loss of consciousness, not on anticoagulation.  Daughter states mother has no diagnosis of dementia however has had increased confusion/memory issues over the past year, usually alert and oriented x4 most days.  Patient placed in c-collar by EMS.  Also endorsing pain to bilateral rib cage.  States has baseline lower extremity pain which is unchanged from prior.  Denies fever, vision change, nausea, vomiting.    In the emergency department patient is uncomfortable appearing, tearful, wearing c-collar.  On exam has tenderness over posterior scalp with hematoma but no active bleeding or abrasion.  Also tenderness over midline cervical spine.  Tenderness to palpation over bilateral lateral rib cage without any significant ecchymosis.  No pelvic tenderness, pelvis stable.  Normal heart sounds, clear lungs.  Differential including but not limited to ICH, scalp contusion, rib fracture, pelvic fracture.  Patient states it was a mechanical slip and fall however daughter takes given her memory issues recently she is unsure.  Will obtain screening labs to evaluate for anemia electrolyte abnormality or signs of infection.  Will treat symptomatically with IV Tylenol.  Will get EKG.  Will reassess.

## 2023-11-03 NOTE — ED PROVIDER NOTE - PHYSICAL EXAMINATION
Gen: WDWN, NAD  HEENT: EOMI +Posterior scalp hematoma without active bleeding or abrasion.   CV: RRR, +S1/S2, no M/R/G, 2+ radial pulses b/l  Resp: CTAB, no W/R/R, no accessory muscle use, no increased work of breathing  GI: Abdomen soft non-distended, NTTP  MSK: +C-spine midline tenderness. +TTP to lateral rib cage diffusely b/l without overlying skin changes. No pelvic tenderness, pelvis stable.   Neuro: CN II-XII grossly intact. A&Ox4, following commands, moving all four extremities spontaneously  Psych: appropriate mood

## 2023-11-03 NOTE — ED ADULT NURSE NOTE - CAS EDP DISCH TYPE
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 Currently endorses class I-II symptoms and appears euvolemic. Still endorsing some fatigue but improved appetite. Notably hypertensive in office to 180s but hadn't taken her medications. Per granddaughter has improved BP at home. \par \par 1. HFrecEF - resolved Takotsubo\par - pt with some fatigue with HR 50-54 bpm, c/w toprol 50 mg daily \par  -will keep a log of B/P and HR with goal HR > 55 and B/P < 130/80\par \par 2. HTN - elevated in office \par - on lisinopril 40 mg daily\par - on christina 25 mg daily \par - had reportedly been taken off hydralazine by Dr. Zabala\par - keep log of BP; inform us if BP >140 in which case will need hydralazine resumed at 25 mg three times/day\par \par 3. Non-obstructive CAD\par - on ASA 81 mg daily and Lipitor 40 mg daily \par - no active chest pain or EKG changes\par \par 4. VT/ S/P ICD\par - Dual chamber [employer] ICD\par - discussed deactivating defibrillator as her atach is nearing the VF threshold and her risk of VT/VF is low given recovery of her LV function; daughter will discuss further\par \par RTC 6 months or sooner prn 
Home

## 2023-11-03 NOTE — ED ADULT NURSE NOTE - NSFALLRISKINTERV_ED_ALL_ED
Communicate fall risk and risk factors to all staff, patient, and family/Provide visual cue: yellow wristband, yellow gown, etc/Reinforce activity limits and safety measures with patient and family/Call bell, personal items and telephone in reach/Instruct patient to call for assistance before getting out of bed/chair/stretcher/Non-slip footwear applied when patient is off stretcher/[place] to call system/Physically safe environment - no spills, clutter or unnecessary equipment/Purposeful Proactive Rounding/Room/bathroom lighting operational, light cord in reach Communicate fall risk and risk factors to all staff, patient, and family/Provide visual cue: yellow wristband, yellow gown, etc/Reinforce activity limits and safety measures with patient and family/Call bell, personal items and telephone in reach/Instruct patient to call for assistance before getting out of bed/chair/stretcher/Non-slip footwear applied when patient is off stretcher/[place] to call system/Physically safe environment - no spills, clutter or unnecessary equipment/Purposeful Proactive Rounding/Room/bathroom lighting operational, light cord in reach Communicate fall risk and risk factors to all staff, patient, and family/Provide visual cue: yellow wristband, yellow gown, etc/Reinforce activity limits and safety measures with patient and family/Call bell, personal items and telephone in reach/Instruct patient to call for assistance before getting out of bed/chair/stretcher/Non-slip footwear applied when patient is off stretcher/[place] to call system/Physically safe environment - no spills, clutter or unnecessary equipment/Purposeful Proactive Rounding/Room/bathroom lighting operational, light cord in reach

## 2023-11-03 NOTE — ED PROVIDER NOTE - PATIENT PORTAL LINK FT
You can access the FollowMyHealth Patient Portal offered by [facility] by registering at the following website: [email or web address]. By joining [employer]’s FollowMyHealth portal, you will also be able to view your health information using other applications (apps) compatible with our system. You can access the FollowMyHealth Patient Portal offered by [facility] by registering at the following website: [email or web address]. By joining [employer]’s FollowMyHealth portal, you will also be able to view your health information using other applications (apps) compatible with our system. You can access the FollowMyHealth Patient Portal offered by [facility] by registering at the following website: [email or web address]. By joining [employer]’s FollowMyHealth portal, you will also be able to view your health information using other applications (apps) compatible with our system.

## 2023-11-03 NOTE — ED ADULT NURSE NOTE - OBJECTIVE STATEMENT
Pt is an 88 yr old female with pmh of asthma and HTN coming from home via ems for trip and fall. Pt's daughter states the patient lives at home alone with her  and she is supposed to use a walker but does not use it enough in the house. Pt slipped and fell backwards- hitting her head no loc. Pt's daughter did not witness the fall but heard it from upstairs. Pt is not on any blood thinners or aspirin. Pt complains of posterior headache and neck pain- and bilateral leg pain- pt however Is a/ox 3-0 vitals stable. Pt is able to move independently in the bed- assisting with changing into a gown. Pt moves all extremities voluntarily. Pt arrived In a c- collar.

## 2023-11-03 NOTE — ED PROVIDER NOTE - ATTENDING CONTRIBUTION TO CARE
attending Catherine: 88yF h/o asthma, HTN, osteoporosis presents after unwitnessed fall at home. Fall heard by daughter who found pt on the floor, awake and alert complaining of pain to back of head. Pt supposed to ambulate with cane but does not use when at home. No LOC or AC use. Pt placed in cervical collar by EMS. Exam as above.  Differential including but not limited to ICH, scalp contusion, spine fx, rib fracture, pelvic fracture.  Will obtain labs, imaging eval traumatic injury, pain control, reassess

## 2023-11-03 NOTE — ED ADULT TRIAGE NOTE - NS ED NURSE AMBULANCES
[facility]

## 2023-11-03 NOTE — ED PROVIDER NOTE - NSFOLLOWUPINSTRUCTIONS_ED_ALL_ED_FT
-- Please follow-up with your doctor(s) within the next 1-2 weeks, but see medical sooner if your symptoms persist or worsen.  Please call tomorrow for an appointment.  If you cannot follow-up with your primary doctor please return to the ED for any urgent issues.  -- You were given a copy of your labs and imaging.  Please go-over these with your doctor(s).   -- If you have any worsening of symptoms or any other concerns please see your doctor or return to the ED immediately.    Contusion    A contusion is a deep bruise. Contusions are the result of a blunt injury to tissues and muscle fibers under the skin. The skin overlying the contusion may turn blue, purple, or yellow. Symptoms also include pain and swelling in the injured area.    SEEK IMMEDIATE MEDICAL CARE IF YOU HAVE ANY OF THE FOLLOWING SYMPTOMS: severe pain, numbness, tingling, pain, weakness, or skin color/temperature change in any part of your body distal to the injury.

## 2023-11-04 ENCOUNTER — NON-APPOINTMENT (OUTPATIENT)
Age: 88
End: 2023-11-04

## 2023-11-05 ENCOUNTER — NON-APPOINTMENT (OUTPATIENT)
Age: 88
End: 2023-11-05

## 2023-11-09 ENCOUNTER — NON-APPOINTMENT (OUTPATIENT)
Age: 88
End: 2023-11-09

## 2023-11-10 ENCOUNTER — LABORATORY RESULT (OUTPATIENT)
Age: 88
End: 2023-11-10

## 2023-11-16 ENCOUNTER — APPOINTMENT (OUTPATIENT)
Dept: PULMONOLOGY | Facility: CLINIC | Age: 88
End: 2023-11-16
Payer: MEDICARE

## 2023-11-16 VITALS
DIASTOLIC BLOOD PRESSURE: 69 MMHG | HEART RATE: 84 BPM | OXYGEN SATURATION: 83 % | WEIGHT: 106 LBS | TEMPERATURE: 98.3 F | SYSTOLIC BLOOD PRESSURE: 132 MMHG | RESPIRATION RATE: 15 BRPM

## 2023-11-16 PROCEDURE — 99203 OFFICE O/P NEW LOW 30 MIN: CPT

## 2023-11-16 RX ORDER — FLUTICASONE PROPIONATE 50 UG/1
50 SPRAY, METERED NASAL
Qty: 1 | Refills: 2 | Status: ACTIVE | COMMUNITY
Start: 2023-11-16 | End: 1900-01-01

## 2023-11-16 RX ORDER — AZELASTINE HYDROCHLORIDE AND FLUTICASONE PROPIONATE 137; 50 UG/1; UG/1
137-50 SPRAY, METERED NASAL
Qty: 1 | Refills: 3 | Status: DISCONTINUED | COMMUNITY
Start: 2023-11-16 | End: 2023-11-16

## 2023-11-16 RX ORDER — AZELASTINE HYDROCHLORIDE 137 UG/1
0.1 SPRAY, METERED NASAL
Qty: 1 | Refills: 2 | Status: ACTIVE | COMMUNITY
Start: 2023-11-16 | End: 1900-01-01

## 2023-12-15 ENCOUNTER — APPOINTMENT (OUTPATIENT)
Dept: PULMONOLOGY | Facility: CLINIC | Age: 88
End: 2023-12-15

## 2024-01-16 PROBLEM — J45.909 UNSPECIFIED ASTHMA, UNCOMPLICATED: Chronic | Status: ACTIVE | Noted: 2023-11-03

## 2024-01-16 PROBLEM — I10 ESSENTIAL (PRIMARY) HYPERTENSION: Chronic | Status: ACTIVE | Noted: 2023-11-03

## 2024-01-16 PROBLEM — M81.0 AGE-RELATED OSTEOPOROSIS WITHOUT CURRENT PATHOLOGICAL FRACTURE: Chronic | Status: ACTIVE | Noted: 2023-11-03

## 2024-01-17 ENCOUNTER — APPOINTMENT (OUTPATIENT)
Dept: HOME HEALTH SERVICES | Facility: HOME HEALTH | Age: 89
End: 2024-01-17
Payer: COMMERCIAL

## 2024-01-17 VITALS
SYSTOLIC BLOOD PRESSURE: 131 MMHG | DIASTOLIC BLOOD PRESSURE: 91 MMHG | OXYGEN SATURATION: 99 % | HEART RATE: 68 BPM | TEMPERATURE: 97.8 F | RESPIRATION RATE: 18 BRPM

## 2024-01-17 DIAGNOSIS — R05.9 COUGH, UNSPECIFIED: ICD-10-CM

## 2024-01-17 DIAGNOSIS — M81.0 AGE-RELATED OSTEOPOROSIS W/OUT CURRENT PATHOLOGICAL FRACTURE: ICD-10-CM

## 2024-01-17 DIAGNOSIS — I70.0 ATHEROSCLEROSIS OF AORTA: ICD-10-CM

## 2024-01-17 DIAGNOSIS — H91.90 UNSPECIFIED HEARING LOSS, UNSPECIFIED EAR: ICD-10-CM

## 2024-01-17 PROCEDURE — 99497 ADVNCD CARE PLAN 30 MIN: CPT

## 2024-01-17 PROCEDURE — 99349 HOME/RES VST EST MOD MDM 40: CPT | Mod: 25

## 2024-01-17 RX ORDER — TRIAMCINOLONE ACETONIDE 55 UG/1
55 SPRAY, METERED NASAL
Refills: 0 | Status: ACTIVE | COMMUNITY
Start: 2024-01-17

## 2024-01-17 RX ORDER — LEVOCETIRIZINE DIHYDROCHLORIDE 5 MG/1
5 TABLET ORAL DAILY
Refills: 0 | Status: COMPLETED | COMMUNITY
Start: 2022-05-02 | End: 2024-01-17

## 2024-01-17 RX ORDER — ALBUTEROL SULFATE 90 UG/1
108 (90 BASE) INHALANT RESPIRATORY (INHALATION)
Qty: 18 | Refills: 6 | Status: ACTIVE | COMMUNITY
Start: 2022-03-04 | End: 1900-01-01

## 2024-01-17 RX ORDER — TRIAMCINOLONE ACETONIDE 1 MG/G
0.1 OINTMENT TOPICAL
Qty: 1 | Refills: 1 | Status: COMPLETED | COMMUNITY
Start: 2023-10-26 | End: 2024-01-17

## 2024-01-17 NOTE — REASON FOR VISIT
[Follow-Up] : a follow-up visit [Spouse] : spouse [Family Member] : family member [Pre-Visit Preparation] : pre-visit preparation was done [Intercurrent Specialty/Sub-specialty Visits] : the patient has no intercurrent specialty/sub-specialty visits [FreeTextEntry1] : follow up for chronic conditions including HTN, Asthma

## 2024-01-17 NOTE — PHYSICAL EXAM
[No Acute Distress] : no acute distress [Normal Voice/Communication] : normal voice communication [Normal Sclera/Conjunctiva] : normal sclera/conjunctiva [PERRL] : pupils equal, round and reactive to light [EOMI] : extra ocular movement intact [Normal Outer Ear/Nose] : the ears and nose were normal in appearance [No JVD] : no jugular venous distention [No Respiratory Distress] : no respiratory distress [Clear to Auscultation] : lungs were clear to auscultation bilaterally [No Accessory Muscle Use] : no accessory muscle use [Normal Rate] : heart rate was normal  [Regular Rhythm] : with a regular rhythm [Normal S1, S2] : normal S1 and S2 [No Murmurs] : no murmurs heard [Normal Bowel Sounds] : normal bowel sounds [Non Tender] : non-tender [Soft] : abdomen soft [Not Distended] : not distended [No Spinal Tenderness] : no spinal tenderness [No Joint Swelling] : no joint swelling seen [No Clubbing, Cyanosis] : no clubbing  or cyanosis of the fingernails [Normal Strength/Tone] : muscle strength and tone were normal [Oriented x3] : oriented to person, place, and time [Normal Affect] : the affect was normal [No Gross Sensory Deficits] : no gross sensory deficits [Normal TMs] : both tympanic membranes were normal [Kyphosis] : no kyphosis present [Scoliosis] : scoliosis not present [de-identified] : Nonpitting edema to b/l LE [de-identified] : Rash on R ankle and L calf that is erythematous, only mildly warmer than surrounding skin, TTP

## 2024-01-17 NOTE — COUNSELING
[Normal Weight - ( BMI  <25 )] : normal weight - ( BMI  <25 ) [Continue diet as tolerated] : continue diet as tolerated based on goals of care [Non - Smoker] : non-smoker [Use assistive device to avoid falls] : use assistive device to avoid falls [Remove clutter and unsafe carpeting to avoid falls] : remove clutter and unsafe carpeting to avoid falls [] : foot exam [Maintain functional ability] : maintain functional ability [Discussed disease trajectory with patient/caregiver] : discussed disease trajectory with patient/caregiver [Likely to achieve goals/desired outcomes] : likely to achieve goals/desired outcomes [Patient/Caregiver has ___ understanding of disease process] : patient/caregiver has [unfilled] understanding of disease process [Completed Medical Orders for Life-Sustaining Treatment] : completed medical orders for life-sustaining treatment [Full Code] : Code Status: Full Code [Limited] : Treatment Guidelines: Limited [Trial of Intubation] : Intubation: Trial of Intubation [[facility]: _____] [facility]last [facility] date: [unfilled] [_____] : HCP: [unfilled] [FreeTextEntry4] : decreased cough

## 2024-01-17 NOTE — HISTORY OF PRESENT ILLNESS
[Patient] : patient [Spouse] : spouse [Family Member] : family member [FreeTextEntry1] : unsteady gait [FreeTextEntry2] : PMH:  hypertension, osteoporosis, mild intermittent asthma, environmental allergies, venous stasis  A&Ox3 Skin: rash on LE that is warm/painful - over a month, tried triamcinolone ointment that only helped a little.  Pain: on legs where rash is, otherwise no pain Gait/falls: has a walker, does not always use it.  Appetite: Does not drink enough water, having a lot of salt.  BM: Good, regular Urine: good Sleep:  Mood: anxious and stressed caring for   Asthma:  on albuterol nebs, Ventolin inhalers, Budesonide, Advair, montelukast   Osteoporosis -as above; was on risedronate but it was stopped in 10/19 due to increased bone pain- taking Ca supplementation  HTN: on hydrochlorothiazide  CKD: GFR 60 in 1/20/23

## 2024-01-17 NOTE — CURRENT MEDS
[Medication and Allergies Reconciled] : medication and allergies reconciled [Reviewed patient reported medication adherence from Comprehensive Assessment] : Reviewed patient reported medication adherence from comprehensive assessment [Adherent to medications] : Patient is adherent to medications as prescribed [High Risk Medications Reviewed and Reconciled (Beers Criteria)] : high risk medications reviewed and reconciled [de-identified] : managed by daughter

## 2024-01-17 NOTE — HEALTH RISK ASSESSMENT
[Independent] : managing medications [Some assistance needed] : managing finances [Two or more falls in past year] : Patient reported two or more falls in the past year [No] : The patient does not have visual impairment [HRA Reviewed] : Health risk assessments reviewed [TimeGetUpGo] : 25

## 2024-01-17 NOTE — REVIEW OF SYSTEMS
[Skin Rash] : skin rash [Negative] : Neurological [FreeTextEntry2] : as noted in HPI [de-identified] : as above [de-identified] : daughter notes separately to patient that she has noted difficulty with memory

## 2024-01-18 ENCOUNTER — LABORATORY RESULT (OUTPATIENT)
Age: 89
End: 2024-01-18

## 2024-01-18 DIAGNOSIS — E55.9 VITAMIN D DEFICIENCY, UNSPECIFIED: ICD-10-CM

## 2024-01-18 LAB
ALBUMIN SERPL ELPH-MCNC: 4.8 G/DL
ALP BLD-CCNC: 69 U/L
ALT SERPL-CCNC: 15 U/L
ANION GAP SERPL CALC-SCNC: 11 MMOL/L
AST SERPL-CCNC: 24 U/L
BASOPHILS # BLD AUTO: 0.05 K/UL
BASOPHILS NFR BLD AUTO: 0.9 %
BILIRUB SERPL-MCNC: 0.3 MG/DL
BUN SERPL-MCNC: 26 MG/DL
CALCIUM SERPL-MCNC: 10.1 MG/DL
CHLORIDE SERPL-SCNC: 101 MMOL/L
CO2 SERPL-SCNC: 26 MMOL/L
CREAT SERPL-MCNC: 1.04 MG/DL
EGFR: 52 ML/MIN/1.73M2
EOSINOPHIL # BLD AUTO: 0.17 K/UL
EOSINOPHIL NFR BLD AUTO: 3 %
GLUCOSE SERPL-MCNC: 90 MG/DL
HCT VFR BLD CALC: 36.8 %
HGB BLD-MCNC: 12.3 G/DL
IMM GRANULOCYTES NFR BLD AUTO: 0.4 %
LYMPHOCYTES # BLD AUTO: 1.54 K/UL
LYMPHOCYTES NFR BLD AUTO: 27.5 %
MAN DIFF?: NORMAL
MCHC RBC-ENTMCNC: 30.9 PG
MCHC RBC-ENTMCNC: 33.4 GM/DL
MCV RBC AUTO: 92.5 FL
MONOCYTES # BLD AUTO: 0.57 K/UL
MONOCYTES NFR BLD AUTO: 10.2 %
NEUTROPHILS # BLD AUTO: 3.25 K/UL
NEUTROPHILS NFR BLD AUTO: 58 %
PLATELET # BLD AUTO: 260 K/UL
POTASSIUM SERPL-SCNC: 4.2 MMOL/L
PROT SERPL-MCNC: 6.8 G/DL
RBC # BLD: 3.98 M/UL
RBC # FLD: 12.8 %
SODIUM SERPL-SCNC: 138 MMOL/L
WBC # FLD AUTO: 5.6 K/UL

## 2024-01-19 LAB
25(OH)D3 SERPL-MCNC: 31.3 NG/ML
ESTIMATED AVERAGE GLUCOSE: 108 MG/DL
HBA1C MFR BLD HPLC: 5.4 %
TSH SERPL-ACNC: 2.05 UIU/ML

## 2024-03-15 ENCOUNTER — RX RENEWAL (OUTPATIENT)
Age: 89
End: 2024-03-15

## 2024-03-15 RX ORDER — FLUTICASONE PROPIONATE AND SALMETEROL 250; 50 UG/1; UG/1
250-50 POWDER RESPIRATORY (INHALATION)
Qty: 180 | Refills: 3 | Status: ACTIVE | COMMUNITY
Start: 2020-04-15 | End: 1900-01-01

## 2024-04-01 ENCOUNTER — APPOINTMENT (OUTPATIENT)
Dept: HOME HEALTH SERVICES | Facility: HOME HEALTH | Age: 89
End: 2024-04-01
Payer: MEDICARE

## 2024-04-01 VITALS
OXYGEN SATURATION: 99 % | HEART RATE: 77 BPM | RESPIRATION RATE: 18 BRPM | SYSTOLIC BLOOD PRESSURE: 157 MMHG | DIASTOLIC BLOOD PRESSURE: 76 MMHG | TEMPERATURE: 97.8 F

## 2024-04-01 DIAGNOSIS — J45.20 MILD INTERMITTENT ASTHMA, UNCOMPLICATED: ICD-10-CM

## 2024-04-01 DIAGNOSIS — I10 ESSENTIAL (PRIMARY) HYPERTENSION: ICD-10-CM

## 2024-04-01 DIAGNOSIS — R68.89 OTHER GENERAL SYMPTOMS AND SIGNS: ICD-10-CM

## 2024-04-01 DIAGNOSIS — R21 RASH AND OTHER NONSPECIFIC SKIN ERUPTION: ICD-10-CM

## 2024-04-01 DIAGNOSIS — Z71.89 OTHER SPECIFIED COUNSELING: ICD-10-CM

## 2024-04-01 DIAGNOSIS — R26.81 UNSTEADINESS ON FEET: ICD-10-CM

## 2024-04-01 DIAGNOSIS — N18.30 CHRONIC KIDNEY DISEASE, STAGE 3 UNSPECIFIED: ICD-10-CM

## 2024-04-01 PROCEDURE — 99497 ADVNCD CARE PLAN 30 MIN: CPT

## 2024-04-01 PROCEDURE — 99349 HOME/RES VST EST MOD MDM 40: CPT | Mod: 25

## 2024-04-01 RX ORDER — CLOTRIMAZOLE AND BETAMETHASONE DIPROPIONATE 10; .5 MG/G; MG/G
1-0.05 CREAM TOPICAL TWICE DAILY
Qty: 1 | Refills: 0 | Status: ACTIVE | COMMUNITY
Start: 2024-04-01 | End: 1900-01-01

## 2024-04-04 PROBLEM — Z71.89 ADVANCE CARE PLANNING: Status: ACTIVE | Noted: 2019-05-28

## 2024-04-04 NOTE — HISTORY OF PRESENT ILLNESS
[Patient] : patient [Spouse] : spouse [Family Member] : family member [FreeTextEntry1] : unsteady gait [FreeTextEntry2] : PMH:  hypertension, osteoporosis, mild intermittent asthma, environmental allergies, venous stasis  Interval events: None  A&Ox3 Skin: rash on LE that is warm/painful - no changes since last visit.  Pain: on legs where rash is, otherwise no pain Gait/falls: has a walker, does not always use it. no falls Appetite: Drinking more water, still having a lot of salt.  BM: Good, regular Urine: good Sleep: good Mood: anxious and stressed caring for   Asthma:  on albuterol nebs, Ventolin inhalers, Budesonide, Advair, montelukast   Osteoporosis -as above; was on risedronate but it was stopped in 10/19 due to increased bone pain- taking Ca supplementation  HTN: on hydrochlorothiazide 25mg daily  CKD: GFR 52 in 1/18/24  MOLST: full code, trial of intubation, use abx and ivf, tbd feeding tube and dialysis.

## 2024-04-04 NOTE — HEALTH RISK ASSESSMENT
[HRA Reviewed] : Health risk assessment reviewed [Independent] : managing medications [Some assistance needed] : using transportation [Two or more falls in past year] : Patient reported two or more falls in the past year [No] : The patient does not have visual impairment [TimeGetUpGo] : 25

## 2024-04-04 NOTE — REASON FOR VISIT
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

## 2024-04-04 NOTE — PHYSICAL EXAM
[No Acute Distress] : no acute distress [Normal Sclera/Conjunctiva] : normal sclera/conjunctiva [Normal Voice/Communication] : normal voice communication [PERRL] : pupils equal, round and reactive to light [Normal Outer Ear/Nose] : the ears and nose were normal in appearance [EOMI] : extra ocular movement intact [No JVD] : no jugular venous distention [Normal TMs] : both tympanic membranes were normal [No Respiratory Distress] : no respiratory distress [No Accessory Muscle Use] : no accessory muscle use [Clear to Auscultation] : lungs were clear to auscultation bilaterally [Normal Rate] : heart rate was normal  [Regular Rhythm] : with a regular rhythm [Normal S1, S2] : normal S1 and S2 [No Murmurs] : no murmurs heard [Normal Bowel Sounds] : normal bowel sounds [Non Tender] : non-tender [Soft] : abdomen soft [Not Distended] : not distended [No Spinal Tenderness] : no spinal tenderness [No Joint Swelling] : no joint swelling seen [No Clubbing, Cyanosis] : no clubbing  or cyanosis of the fingernails [No Gross Sensory Deficits] : no gross sensory deficits [Normal Strength/Tone] : muscle strength and tone were normal [Oriented x3] : oriented to person, place, and time [Normal Affect] : the affect was normal [Kyphosis] : no kyphosis present [Scoliosis] : scoliosis not present [de-identified] : Nonpitting edema to b/l LE [de-identified] : Rash on R ankle and L calf that is erythematous, only mildly warmer than surrounding skin, TTP

## 2024-04-04 NOTE — COUNSELING
[Continue diet as tolerated] : continue diet as tolerated based on goals of care [Normal Weight - ( BMI  <25 )] : normal weight - ( BMI  <25 ) [Use assistive device to avoid falls] : use assistive device to avoid falls [Non - Smoker] : non-smoker [Remove clutter and unsafe carpeting to avoid falls] : remove clutter and unsafe carpeting to avoid falls [] : foot exam [Maintain functional ability] : maintain functional ability [Discussed disease trajectory with patient/caregiver] : discussed disease trajectory with patient/caregiver [Likely to achieve goals/desired outcomes] : likely to achieve goals/desired outcomes [Patient/Caregiver has ___ understanding of disease process] : patient/caregiver has [unfilled] understanding of disease process [Completed Medical Orders for Life-Sustaining Treatment] : completed medical orders for life-sustaining treatment [Full Code] : Code Status: Full Code [Limited] : Treatment Guidelines: Limited [Trial of Intubation] : Intubation: Trial of Intubation [_____] : HCP: [unfilled] [[facility] Date: _____] [facility] Com[facility]/last verification date: [unfilled] [FreeTextEntry4] : decreased cough

## 2024-04-04 NOTE — REVIEW OF SYSTEMS
[Skin Rash] : skin rash [Negative] : Heme/Lymph [FreeTextEntry2] : as noted in HPI [de-identified] : as per HPI [de-identified] : daughter notes separately to patient forgetfulness

## 2024-04-04 NOTE — CURRENT MEDS
[Medication and Allergies Reconciled] : medication and allergies reconciled [High Risk Medications Reviewed and Reconciled (Beers Criteria)] : high risk medications reviewed and reconciled [Reviewed patient reported medication adherence from Comprehensive Assessment] : Reviewed patient reported medication adherence from comprehensive assessment [Adherent to medications] : Patient is adherent to medications as prescribed [de-identified] : managed by daughter

## 2024-06-28 ENCOUNTER — APPOINTMENT (OUTPATIENT)
Dept: HOME HEALTH SERVICES | Facility: HOME HEALTH | Age: 89
End: 2024-06-28

## 2024-08-28 ENCOUNTER — APPOINTMENT (OUTPATIENT)
Dept: HOME HEALTH SERVICES | Facility: HOME HEALTH | Age: 89
End: 2024-08-28

## 2024-08-28 PROCEDURE — 99349 HOME/RES VST EST MOD MDM 40: CPT

## 2024-09-10 ENCOUNTER — RX RENEWAL (OUTPATIENT)
Age: 89
End: 2024-09-10

## 2024-11-05 ENCOUNTER — RX RENEWAL (OUTPATIENT)
Age: 89
End: 2024-11-05

## 2024-11-06 ENCOUNTER — RX RENEWAL (OUTPATIENT)
Age: 89
End: 2024-11-06

## 2024-11-27 ENCOUNTER — APPOINTMENT (OUTPATIENT)
Dept: HOME HEALTH SERVICES | Facility: HOME HEALTH | Age: 89
End: 2024-11-27
Payer: MEDICARE

## 2024-11-27 VITALS
OXYGEN SATURATION: 99 % | DIASTOLIC BLOOD PRESSURE: 76 MMHG | TEMPERATURE: 97.3 F | RESPIRATION RATE: 18 BRPM | SYSTOLIC BLOOD PRESSURE: 152 MMHG | HEART RATE: 88 BPM

## 2024-11-27 DIAGNOSIS — Z23 ENCOUNTER FOR IMMUNIZATION: ICD-10-CM

## 2024-11-27 DIAGNOSIS — N18.30 CHRONIC KIDNEY DISEASE, STAGE 3 UNSPECIFIED: ICD-10-CM

## 2024-11-27 DIAGNOSIS — R21 RASH AND OTHER NONSPECIFIC SKIN ERUPTION: ICD-10-CM

## 2024-11-27 DIAGNOSIS — R05.9 COUGH, UNSPECIFIED: ICD-10-CM

## 2024-11-27 DIAGNOSIS — J45.20 MILD INTERMITTENT ASTHMA, UNCOMPLICATED: ICD-10-CM

## 2024-11-27 PROCEDURE — 99349 HOME/RES VST EST MOD MDM 40: CPT | Mod: 25

## 2024-11-27 PROCEDURE — G0008: CPT

## 2024-11-27 PROCEDURE — 90662 IIV NO PRSV INCREASED AG IM: CPT

## 2024-11-27 RX ORDER — PANTOPRAZOLE SODIUM 40 MG/1
40 GRANULE, DELAYED RELEASE ORAL
Qty: 14 | Refills: 0 | Status: ACTIVE | COMMUNITY
Start: 2024-11-27 | End: 1900-01-01

## 2024-11-29 ENCOUNTER — NON-APPOINTMENT (OUTPATIENT)
Age: 89
End: 2024-11-29

## 2024-12-03 ENCOUNTER — LABORATORY RESULT (OUTPATIENT)
Age: 89
End: 2024-12-03

## 2024-12-27 ENCOUNTER — APPOINTMENT (OUTPATIENT)
Dept: DERMATOLOGY | Facility: CLINIC | Age: 89
End: 2024-12-27
Payer: MEDICARE

## 2024-12-27 ENCOUNTER — NON-APPOINTMENT (OUTPATIENT)
Age: 89
End: 2024-12-27

## 2024-12-27 ENCOUNTER — APPOINTMENT (OUTPATIENT)
Dept: OTOLARYNGOLOGY | Facility: CLINIC | Age: 89
End: 2024-12-27
Payer: MEDICARE

## 2024-12-27 VITALS
OXYGEN SATURATION: 98 % | HEIGHT: 60 IN | BODY MASS INDEX: 21.2 KG/M2 | DIASTOLIC BLOOD PRESSURE: 79 MMHG | HEART RATE: 76 BPM | WEIGHT: 108 LBS | SYSTOLIC BLOOD PRESSURE: 168 MMHG

## 2024-12-27 VITALS — BODY MASS INDEX: 21.2 KG/M2 | WEIGHT: 108 LBS | HEIGHT: 60 IN

## 2024-12-27 DIAGNOSIS — Z78.9 OTHER SPECIFIED HEALTH STATUS: ICD-10-CM

## 2024-12-27 DIAGNOSIS — H90.3 SENSORINEURAL HEARING LOSS, BILATERAL: ICD-10-CM

## 2024-12-27 DIAGNOSIS — I87.8 OTHER SPECIFIED DISORDERS OF VEINS: ICD-10-CM

## 2024-12-27 DIAGNOSIS — L30.9 DERMATITIS, UNSPECIFIED: ICD-10-CM

## 2024-12-27 DIAGNOSIS — H61.23 IMPACTED CERUMEN, BILATERAL: ICD-10-CM

## 2024-12-27 DIAGNOSIS — H93.293 OTHER ABNORMAL AUDITORY PERCEPTIONS, BILATERAL: ICD-10-CM

## 2024-12-27 PROCEDURE — 92557 COMPREHENSIVE HEARING TEST: CPT

## 2024-12-27 PROCEDURE — G2211 COMPLEX E/M VISIT ADD ON: CPT

## 2024-12-27 PROCEDURE — G0268 REMOVAL OF IMPACTED WAX MD: CPT

## 2024-12-27 PROCEDURE — 99203 OFFICE O/P NEW LOW 30 MIN: CPT | Mod: 25

## 2024-12-27 PROCEDURE — 92567 TYMPANOMETRY: CPT

## 2024-12-27 PROCEDURE — 99204 OFFICE O/P NEW MOD 45 MIN: CPT

## 2024-12-27 RX ORDER — CLOBETASOL PROPIONATE 0.5 MG/G
0.05 OINTMENT TOPICAL
Qty: 1 | Refills: 2 | Status: ACTIVE | COMMUNITY
Start: 2024-12-27 | End: 1900-01-01

## 2025-01-03 ENCOUNTER — APPOINTMENT (OUTPATIENT)
Dept: VASCULAR SURGERY | Facility: CLINIC | Age: 89
End: 2025-01-03
Payer: MEDICARE

## 2025-01-03 VITALS
HEART RATE: 83 BPM | TEMPERATURE: 97.7 F | DIASTOLIC BLOOD PRESSURE: 76 MMHG | HEIGHT: 61 IN | WEIGHT: 98 LBS | SYSTOLIC BLOOD PRESSURE: 172 MMHG | BODY MASS INDEX: 18.5 KG/M2

## 2025-01-03 DIAGNOSIS — M79.89 OTHER SPECIFIED SOFT TISSUE DISORDERS: ICD-10-CM

## 2025-01-03 PROCEDURE — 93970 EXTREMITY STUDY: CPT

## 2025-01-03 PROCEDURE — 99204 OFFICE O/P NEW MOD 45 MIN: CPT

## 2025-02-12 ENCOUNTER — APPOINTMENT (OUTPATIENT)
Dept: HOME HEALTH SERVICES | Facility: HOME HEALTH | Age: 89
End: 2025-02-12
Payer: MEDICARE

## 2025-02-12 VITALS
SYSTOLIC BLOOD PRESSURE: 138 MMHG | DIASTOLIC BLOOD PRESSURE: 72 MMHG | RESPIRATION RATE: 18 BRPM | OXYGEN SATURATION: 99 % | TEMPERATURE: 97.2 F | HEART RATE: 77 BPM

## 2025-02-12 DIAGNOSIS — J45.20 MILD INTERMITTENT ASTHMA, UNCOMPLICATED: ICD-10-CM

## 2025-02-12 DIAGNOSIS — I70.0 ATHEROSCLEROSIS OF AORTA: ICD-10-CM

## 2025-02-12 DIAGNOSIS — I10 ESSENTIAL (PRIMARY) HYPERTENSION: ICD-10-CM

## 2025-02-12 DIAGNOSIS — L30.9 DERMATITIS, UNSPECIFIED: ICD-10-CM

## 2025-02-12 DIAGNOSIS — R26.81 UNSTEADINESS ON FEET: ICD-10-CM

## 2025-02-12 DIAGNOSIS — N18.30 CHRONIC KIDNEY DISEASE, STAGE 3 UNSPECIFIED: ICD-10-CM

## 2025-02-12 PROCEDURE — 99348 HOME/RES VST EST LOW MDM 30: CPT

## 2025-02-24 ENCOUNTER — RX RENEWAL (OUTPATIENT)
Age: 89
End: 2025-02-24

## 2025-03-05 ENCOUNTER — RX RENEWAL (OUTPATIENT)
Age: 89
End: 2025-03-05

## 2025-03-14 ENCOUNTER — RX RENEWAL (OUTPATIENT)
Age: 89
End: 2025-03-14